# Patient Record
Sex: MALE | Race: WHITE | Employment: OTHER | URBAN - METROPOLITAN AREA
[De-identification: names, ages, dates, MRNs, and addresses within clinical notes are randomized per-mention and may not be internally consistent; named-entity substitution may affect disease eponyms.]

---

## 2017-03-06 PROBLEM — I10 ESSENTIAL HYPERTENSION: Status: ACTIVE | Noted: 2017-03-06

## 2017-03-06 PROBLEM — E11.40 TYPE 2 DIABETES MELLITUS WITH DIABETIC NEUROPATHY, WITH LONG-TERM CURRENT USE OF INSULIN (HCC): Status: ACTIVE | Noted: 2017-03-06

## 2017-03-06 PROBLEM — E11.319 TYPE 2 DIABETES MELLITUS WITH RETINOPATHY, WITH LONG-TERM CURRENT USE OF INSULIN, MACULAR EDEMA PRESENCE UNSPECIFIED, UNSPECIFIED LATERALITY, UNSPECIFIED RETINOPATHY SEVERITY (HCC): Status: ACTIVE | Noted: 2017-03-06

## 2017-03-06 PROBLEM — Z79.4 TYPE 2 DIABETES MELLITUS WITH DIABETIC NEUROPATHY, WITH LONG-TERM CURRENT USE OF INSULIN (HCC): Status: ACTIVE | Noted: 2017-03-06

## 2017-03-06 PROBLEM — Z79.4 TYPE 2 DIABETES MELLITUS WITH RETINOPATHY, WITH LONG-TERM CURRENT USE OF INSULIN, MACULAR EDEMA PRESENCE UNSPECIFIED, UNSPECIFIED LATERALITY, UNSPECIFIED RETINOPATHY SEVERITY (HCC): Status: ACTIVE | Noted: 2017-03-06

## 2017-07-05 PROCEDURE — 82607 VITAMIN B-12: CPT | Performed by: FAMILY MEDICINE

## 2017-07-05 PROCEDURE — 82746 ASSAY OF FOLIC ACID SERUM: CPT | Performed by: FAMILY MEDICINE

## 2017-07-05 PROCEDURE — 87086 URINE CULTURE/COLONY COUNT: CPT | Performed by: FAMILY MEDICINE

## 2017-07-05 PROCEDURE — 81001 URINALYSIS AUTO W/SCOPE: CPT | Performed by: FAMILY MEDICINE

## 2017-10-10 PROCEDURE — 81015 MICROSCOPIC EXAM OF URINE: CPT | Performed by: UROLOGY

## 2017-10-10 PROCEDURE — 87086 URINE CULTURE/COLONY COUNT: CPT | Performed by: UROLOGY

## 2017-10-10 PROCEDURE — 87186 SC STD MICRODIL/AGAR DIL: CPT | Performed by: UROLOGY

## 2017-10-10 PROCEDURE — 87077 CULTURE AEROBIC IDENTIFY: CPT | Performed by: UROLOGY

## 2017-11-03 PROCEDURE — 82607 VITAMIN B-12: CPT | Performed by: FAMILY MEDICINE

## 2017-11-03 PROCEDURE — 86780 TREPONEMA PALLIDUM: CPT | Performed by: FAMILY MEDICINE

## 2017-11-03 PROCEDURE — 82746 ASSAY OF FOLIC ACID SERUM: CPT | Performed by: FAMILY MEDICINE

## 2017-11-03 PROCEDURE — 81003 URINALYSIS AUTO W/O SCOPE: CPT | Performed by: FAMILY MEDICINE

## 2018-04-26 PROCEDURE — 80061 LIPID PANEL: CPT | Performed by: INTERNAL MEDICINE

## 2018-04-26 PROCEDURE — 83721 ASSAY OF BLOOD LIPOPROTEIN: CPT | Performed by: INTERNAL MEDICINE

## 2019-07-29 PROCEDURE — 81003 URINALYSIS AUTO W/O SCOPE: CPT | Performed by: FAMILY MEDICINE

## 2019-07-29 PROCEDURE — 87086 URINE CULTURE/COLONY COUNT: CPT | Performed by: FAMILY MEDICINE

## 2021-08-30 PROBLEM — F33.1 MODERATE EPISODE OF RECURRENT MAJOR DEPRESSIVE DISORDER (HCC): Status: ACTIVE | Noted: 2021-08-30

## 2021-09-28 PROBLEM — B35.1 ONYCHOMYCOSIS: Status: ACTIVE | Noted: 2021-09-28

## 2021-10-11 ENCOUNTER — APPOINTMENT (OUTPATIENT)
Dept: WOUND CARE | Facility: HOSPITAL | Age: 71
End: 2021-10-11
Attending: CLINICAL NURSE SPECIALIST
Payer: MEDICARE

## 2021-10-12 ENCOUNTER — OFFICE VISIT (OUTPATIENT)
Dept: WOUND CARE | Facility: HOSPITAL | Age: 71
End: 2021-10-12
Attending: CLINICAL NURSE SPECIALIST
Payer: MEDICARE

## 2021-10-12 VITALS
TEMPERATURE: 99 F | HEIGHT: 69 IN | WEIGHT: 300 LBS | DIASTOLIC BLOOD PRESSURE: 87 MMHG | SYSTOLIC BLOOD PRESSURE: 151 MMHG | RESPIRATION RATE: 20 BRPM | BODY MASS INDEX: 44.43 KG/M2 | HEART RATE: 90 BPM | OXYGEN SATURATION: 92 %

## 2021-10-12 DIAGNOSIS — E11.40 TYPE 2 DIABETES MELLITUS WITH DIABETIC NEUROPATHY, WITH LONG-TERM CURRENT USE OF INSULIN (HCC): ICD-10-CM

## 2021-10-12 DIAGNOSIS — Z79.4 TYPE 2 DIABETES MELLITUS WITH DIABETIC NEUROPATHY, WITH LONG-TERM CURRENT USE OF INSULIN (HCC): ICD-10-CM

## 2021-10-12 DIAGNOSIS — B35.1 ONYCHOMYCOSIS: ICD-10-CM

## 2021-10-12 PROCEDURE — 99214 OFFICE O/P EST MOD 30 MIN: CPT | Performed by: CLINICAL NURSE SPECIALIST

## 2021-10-12 NOTE — PROGRESS NOTES
Subjective   Omega Part is a 66year old male. Patient presents with:  Wound Care    Patient is a 53-year-old male with a history of type 2 diabetes mellitus, Hyperlipidemia, hypertension, neuropathy, depression, S/P left BKA.   Patient was referred t prevention of reopening of foot/toe wounds. Patient and spouse verbalized understanding of instructions.        Encounter Diagnosis  Type 2 diabetes mellitus with diabetic neuropathy, with long-term current use of insulin ([facility] [address]  Onychomycosis    Problem Lis

## 2024-07-10 ENCOUNTER — HOSPITAL ENCOUNTER (INPATIENT)
Facility: HOSPITAL | Age: 74
LOS: 7 days | Discharge: HOME HEALTH | End: 2024-07-17
Attending: EMERGENCY MEDICINE | Admitting: INTERNAL MEDICINE
Payer: MEDICARE

## 2024-07-10 DIAGNOSIS — L03.115 CELLULITIS OF RIGHT LOWER EXTREMITY: Primary | ICD-10-CM

## 2024-07-10 LAB
ANION GAP SERPL CALC-SCNC: 8 MMOL/L (ref 0–18)
BASOPHILS # BLD AUTO: 0.08 X10(3) UL (ref 0–0.2)
BASOPHILS NFR BLD AUTO: 0.6 %
BUN BLD-MCNC: 28 MG/DL (ref 9–23)
BUN/CREAT SERPL: 18.1 (ref 10–20)
CALCIUM BLD-MCNC: 9.5 MG/DL (ref 8.7–10.4)
CHLORIDE SERPL-SCNC: 105 MMOL/L (ref 98–112)
CO2 SERPL-SCNC: 26 MMOL/L (ref 21–32)
CREAT BLD-MCNC: 1.55 MG/DL
DEPRECATED RDW RBC AUTO: 45.3 FL (ref 35.1–46.3)
EGFRCR SERPLBLD CKD-EPI 2021: 46 ML/MIN/1.73M2 (ref 60–?)
EOSINOPHIL # BLD AUTO: 0.53 X10(3) UL (ref 0–0.7)
EOSINOPHIL NFR BLD AUTO: 3.9 %
ERYTHROCYTE [DISTWIDTH] IN BLOOD BY AUTOMATED COUNT: 14.6 % (ref 11–15)
GLUCOSE BLD-MCNC: 155 MG/DL (ref 70–99)
GLUCOSE BLDC GLUCOMTR-MCNC: 188 MG/DL (ref 70–99)
HCT VFR BLD AUTO: 48.1 %
HGB BLD-MCNC: 16.3 G/DL
IMM GRANULOCYTES # BLD AUTO: 0.05 X10(3) UL (ref 0–1)
IMM GRANULOCYTES NFR BLD: 0.4 %
LACTATE SERPL-SCNC: 1.2 MMOL/L (ref 0.5–2)
LYMPHOCYTES # BLD AUTO: 2.13 X10(3) UL (ref 1–4)
LYMPHOCYTES NFR BLD AUTO: 15.8 %
MCH RBC QN AUTO: 29.1 PG (ref 26–34)
MCHC RBC AUTO-ENTMCNC: 33.9 G/DL (ref 31–37)
MCV RBC AUTO: 85.7 FL
MONOCYTES # BLD AUTO: 1.41 X10(3) UL (ref 0.1–1)
MONOCYTES NFR BLD AUTO: 10.5 %
NEUTROPHILS # BLD AUTO: 9.25 X10 (3) UL (ref 1.5–7.7)
NEUTROPHILS # BLD AUTO: 9.25 X10(3) UL (ref 1.5–7.7)
NEUTROPHILS NFR BLD AUTO: 68.8 %
OSMOLALITY SERPL CALC.SUM OF ELEC: 297 MOSM/KG (ref 275–295)
PLATELET # BLD AUTO: 651 10(3)UL (ref 150–450)
POTASSIUM SERPL-SCNC: 4.5 MMOL/L (ref 3.5–5.1)
RBC # BLD AUTO: 5.61 X10(6)UL
SODIUM SERPL-SCNC: 139 MMOL/L (ref 136–145)
WBC # BLD AUTO: 13.5 X10(3) UL (ref 4–11)

## 2024-07-10 PROCEDURE — 87186 SC STD MICRODIL/AGAR DIL: CPT | Performed by: EMERGENCY MEDICINE

## 2024-07-10 PROCEDURE — 83605 ASSAY OF LACTIC ACID: CPT | Performed by: EMERGENCY MEDICINE

## 2024-07-10 PROCEDURE — 87205 SMEAR GRAM STAIN: CPT | Performed by: EMERGENCY MEDICINE

## 2024-07-10 PROCEDURE — 87040 BLOOD CULTURE FOR BACTERIA: CPT | Performed by: EMERGENCY MEDICINE

## 2024-07-10 PROCEDURE — 80048 BASIC METABOLIC PNL TOTAL CA: CPT | Performed by: EMERGENCY MEDICINE

## 2024-07-10 PROCEDURE — 87070 CULTURE OTHR SPECIMN AEROBIC: CPT | Performed by: EMERGENCY MEDICINE

## 2024-07-10 PROCEDURE — 82962 GLUCOSE BLOOD TEST: CPT

## 2024-07-10 PROCEDURE — 36415 COLL VENOUS BLD VENIPUNCTURE: CPT

## 2024-07-10 PROCEDURE — 99285 EMERGENCY DEPT VISIT HI MDM: CPT

## 2024-07-10 PROCEDURE — 87077 CULTURE AEROBIC IDENTIFY: CPT | Performed by: EMERGENCY MEDICINE

## 2024-07-10 PROCEDURE — 85025 COMPLETE CBC W/AUTO DIFF WBC: CPT | Performed by: EMERGENCY MEDICINE

## 2024-07-10 PROCEDURE — 96365 THER/PROPH/DIAG IV INF INIT: CPT

## 2024-07-10 RX ORDER — DEXTROSE MONOHYDRATE 25 G/50ML
50 INJECTION, SOLUTION INTRAVENOUS
Status: DISCONTINUED | OUTPATIENT
Start: 2024-07-10 | End: 2024-07-17

## 2024-07-10 RX ORDER — NICOTINE POLACRILEX 4 MG
30 LOZENGE BUCCAL
Status: DISCONTINUED | OUTPATIENT
Start: 2024-07-10 | End: 2024-07-17

## 2024-07-10 RX ORDER — NICOTINE POLACRILEX 4 MG
15 LOZENGE BUCCAL
Status: DISCONTINUED | OUTPATIENT
Start: 2024-07-10 | End: 2024-07-17

## 2024-07-10 RX ORDER — BUPROPION HYDROCHLORIDE 150 MG/1
150 TABLET ORAL DAILY
Status: DISCONTINUED | OUTPATIENT
Start: 2024-07-11 | End: 2024-07-17

## 2024-07-10 RX ORDER — CLOPIDOGREL BISULFATE 75 MG/1
75 TABLET ORAL DAILY
Status: DISCONTINUED | OUTPATIENT
Start: 2024-07-11 | End: 2024-07-17

## 2024-07-10 RX ORDER — ACETAMINOPHEN 325 MG/1
650 TABLET ORAL EVERY 6 HOURS PRN
Status: DISCONTINUED | OUTPATIENT
Start: 2024-07-10 | End: 2024-07-17

## 2024-07-10 RX ORDER — INSULIN DEGLUDEC 100 U/ML
40 INJECTION, SOLUTION SUBCUTANEOUS NIGHTLY
Status: DISCONTINUED | OUTPATIENT
Start: 2024-07-10 | End: 2024-07-17

## 2024-07-10 RX ORDER — DOXYCYCLINE HYCLATE 100 MG/1
100 CAPSULE ORAL 2 TIMES DAILY
COMMUNITY
End: 2024-07-17

## 2024-07-10 RX ORDER — PANTOPRAZOLE SODIUM 40 MG/1
40 TABLET, DELAYED RELEASE ORAL
Status: DISCONTINUED | OUTPATIENT
Start: 2024-07-11 | End: 2024-07-17

## 2024-07-10 RX ORDER — CLOPIDOGREL BISULFATE 75 MG/1
75 TABLET ORAL DAILY
COMMUNITY

## 2024-07-10 RX ORDER — ENOXAPARIN SODIUM 100 MG/ML
40 INJECTION SUBCUTANEOUS DAILY
Status: DISCONTINUED | OUTPATIENT
Start: 2024-07-11 | End: 2024-07-11

## 2024-07-10 RX ORDER — GEMFIBROZIL 600 MG/1
600 TABLET, FILM COATED ORAL DAILY
COMMUNITY

## 2024-07-10 RX ORDER — ROSUVASTATIN CALCIUM 20 MG/1
20 TABLET, COATED ORAL NIGHTLY
COMMUNITY

## 2024-07-10 RX ORDER — ROSUVASTATIN CALCIUM 20 MG/1
20 TABLET, COATED ORAL NIGHTLY
Status: DISCONTINUED | OUTPATIENT
Start: 2024-07-10 | End: 2024-07-17

## 2024-07-10 RX ORDER — VANCOMYCIN 2 GRAM/500 ML IN 0.9 % SODIUM CHLORIDE INTRAVENOUS
15 EVERY 12 HOURS
Status: DISCONTINUED | OUTPATIENT
Start: 2024-07-10 | End: 2024-07-11

## 2024-07-10 RX ORDER — AMLODIPINE BESYLATE 5 MG/1
5 TABLET ORAL DAILY
Status: DISCONTINUED | OUTPATIENT
Start: 2024-07-11 | End: 2024-07-17

## 2024-07-10 RX ORDER — GEMFIBROZIL 600 MG/1
600 TABLET, FILM COATED ORAL DAILY
Status: DISCONTINUED | OUTPATIENT
Start: 2024-07-11 | End: 2024-07-17

## 2024-07-10 RX ORDER — GABAPENTIN 100 MG/1
100 CAPSULE ORAL 3 TIMES DAILY
Status: DISCONTINUED | OUTPATIENT
Start: 2024-07-10 | End: 2024-07-17

## 2024-07-10 NOTE — ED INITIAL ASSESSMENT (HPI)
Pt to ED for cellulitis to the right leg. Pt put on antibiotics, but PCP wants pt to have a followup in the ED. Pt denies any increased redness, denies any fevers.

## 2024-07-11 LAB
ANION GAP SERPL CALC-SCNC: 7 MMOL/L (ref 0–18)
BUN BLD-MCNC: 27 MG/DL (ref 9–23)
BUN/CREAT SERPL: 17.5 (ref 10–20)
CALCIUM BLD-MCNC: 9.1 MG/DL (ref 8.7–10.4)
CHLORIDE SERPL-SCNC: 106 MMOL/L (ref 98–112)
CO2 SERPL-SCNC: 27 MMOL/L (ref 21–32)
CREAT BLD-MCNC: 1.54 MG/DL
DEPRECATED RDW RBC AUTO: 46.4 FL (ref 35.1–46.3)
EGFRCR SERPLBLD CKD-EPI 2021: 47 ML/MIN/1.73M2 (ref 60–?)
ERYTHROCYTE [DISTWIDTH] IN BLOOD BY AUTOMATED COUNT: 14.6 % (ref 11–15)
EST. AVERAGE GLUCOSE BLD GHB EST-MCNC: 192 MG/DL (ref 68–126)
GLUCOSE BLD-MCNC: 199 MG/DL (ref 70–99)
GLUCOSE BLDC GLUCOMTR-MCNC: 118 MG/DL (ref 70–99)
GLUCOSE BLDC GLUCOMTR-MCNC: 142 MG/DL (ref 70–99)
GLUCOSE BLDC GLUCOMTR-MCNC: 196 MG/DL (ref 70–99)
GLUCOSE BLDC GLUCOMTR-MCNC: 214 MG/DL (ref 70–99)
HBA1C MFR BLD: 8.3 % (ref ?–5.7)
HCT VFR BLD AUTO: 46 %
HGB BLD-MCNC: 15.4 G/DL
MCH RBC QN AUTO: 28.8 PG (ref 26–34)
MCHC RBC AUTO-ENTMCNC: 33.5 G/DL (ref 31–37)
MCV RBC AUTO: 86 FL
OSMOLALITY SERPL CALC.SUM OF ELEC: 301 MOSM/KG (ref 275–295)
PLATELET # BLD AUTO: 605 10(3)UL (ref 150–450)
POTASSIUM SERPL-SCNC: 4.5 MMOL/L (ref 3.5–5.1)
RBC # BLD AUTO: 5.35 X10(6)UL
SODIUM SERPL-SCNC: 140 MMOL/L (ref 136–145)
WBC # BLD AUTO: 12.1 X10(3) UL (ref 4–11)

## 2024-07-11 PROCEDURE — 82962 GLUCOSE BLOOD TEST: CPT

## 2024-07-11 PROCEDURE — 99212 OFFICE O/P EST SF 10 MIN: CPT

## 2024-07-11 PROCEDURE — 83036 HEMOGLOBIN GLYCOSYLATED A1C: CPT | Performed by: INTERNAL MEDICINE

## 2024-07-11 PROCEDURE — 85027 COMPLETE CBC AUTOMATED: CPT | Performed by: INTERNAL MEDICINE

## 2024-07-11 PROCEDURE — 80048 BASIC METABOLIC PNL TOTAL CA: CPT | Performed by: INTERNAL MEDICINE

## 2024-07-11 RX ORDER — MELATONIN
3 NIGHTLY PRN
Status: DISCONTINUED | OUTPATIENT
Start: 2024-07-11 | End: 2024-07-17

## 2024-07-11 RX ORDER — ACETAMINOPHEN 500 MG
1000 TABLET ORAL EVERY 6 HOURS PRN
Status: DISCONTINUED | OUTPATIENT
Start: 2024-07-11 | End: 2024-07-17

## 2024-07-11 RX ORDER — HEPARIN SODIUM 5000 [USP'U]/ML
5000 INJECTION, SOLUTION INTRAVENOUS; SUBCUTANEOUS EVERY 8 HOURS SCHEDULED
Status: DISCONTINUED | OUTPATIENT
Start: 2024-07-11 | End: 2024-07-11

## 2024-07-11 RX ORDER — ROSUVASTATIN CALCIUM 20 MG/1
20 TABLET, COATED ORAL NIGHTLY
Status: DISCONTINUED | OUTPATIENT
Start: 2024-07-11 | End: 2024-07-11

## 2024-07-11 RX ORDER — BISACODYL 10 MG
10 SUPPOSITORY, RECTAL RECTAL
Status: DISCONTINUED | OUTPATIENT
Start: 2024-07-11 | End: 2024-07-17

## 2024-07-11 RX ORDER — SENNOSIDES 8.6 MG
17.2 TABLET ORAL NIGHTLY PRN
Status: DISCONTINUED | OUTPATIENT
Start: 2024-07-11 | End: 2024-07-17

## 2024-07-11 RX ORDER — POLYETHYLENE GLYCOL 3350 17 G/17G
17 POWDER, FOR SOLUTION ORAL DAILY PRN
Status: DISCONTINUED | OUTPATIENT
Start: 2024-07-11 | End: 2024-07-17

## 2024-07-11 RX ORDER — CLOPIDOGREL BISULFATE 75 MG/1
75 TABLET ORAL DAILY
Status: DISCONTINUED | OUTPATIENT
Start: 2024-07-11 | End: 2024-07-11

## 2024-07-11 RX ORDER — ONDANSETRON 2 MG/ML
4 INJECTION INTRAMUSCULAR; INTRAVENOUS EVERY 6 HOURS PRN
Status: DISCONTINUED | OUTPATIENT
Start: 2024-07-11 | End: 2024-07-17

## 2024-07-11 RX ORDER — HEPARIN SODIUM 5000 [USP'U]/ML
5000 INJECTION, SOLUTION INTRAVENOUS; SUBCUTANEOUS EVERY 8 HOURS SCHEDULED
Status: DISCONTINUED | OUTPATIENT
Start: 2024-07-12 | End: 2024-07-14

## 2024-07-11 NOTE — CONSULTS
[facility]  part of [facility] Infectious Disease Consult    Connor Mcgarry Patient Status:  Inpatient    1950 MRN [id number]   Location [facility] 5SW/SE Attending Noah Pollack MD   Hosp Day # 1 PCP Brittny Patino DO     Reason for Consultation:  To help with infection and treatment, requested by Dr. Pollack,     History of Present Illness:  Connor Mcgarry is a 74 year old male admitted to [facility] on 7/10 with RLE Erythema,   Significant hx of   - DM,  - Hx of L BKA 10 years ago, was colonized with MRSA and PSAR at that time too,   - Morbid obesity,   - Hx of Cellulitis of LE in the past,   Came to the hospital for new Cellulitis of RLE, erythema was going on for a week PTA,  He was on PO Doxy as OP for four days with out much improvement,   Had some blisters which desquamated,   Also had significant drainage from the LE,  No fever, chills,  Secondary to all of the above he decided to come to the hospital,   ID is now asked to help with infection and treatment,        History:  Past Medical History:    Anemia    BPH (benign prostatic hyperplasia)    Calculus of kidney    Cataract    with lens implants in both eyes    Hearing impairment    Has hearing aid in right ear.    Hiatal hernia    High blood pressure    High cholesterol    HTN (hypertension)    Hypertrophy of prostate without urinary obstruction and other lower urinary tract symptoms (LUTS)    Neuropathy    bill feet    Other and unspecified hyperlipidemia    Peripheral vascular disease (HCC)    Type II or unspecified type diabetes mellitus without mention of complication, not stated as uncontrolled    Unspecified essential hypertension     Past Surgical History:   Procedure Laterality Date    Angiogram      Angioplasty (coronary)      Cataract      Other      heplock ? for IV antibiotic    Other surgical history      Cataract surgery Left eye    Other surgical history  14    Cysto Stent Removal-Dr Claros    Other  surgical history  5/2014    Left great toe amputation    Other surgical history  1966    spleenectomy     Other surgical history  8/22/14    Left knee reamputation BKA    Remv cataract extracap,insert lens  11/29/2012    Procedure: RIGHT PHACOEMULSIFICATION OF CATARACT WITH INTRAOCULAR LENS IMPLANT [id number];  Surgeon: Delia Rizvi MD;  Location: [facility]    Subconjunctival injectn  12/11/2012    Procedure: PARS PLANA VITRECTOMY 25 GAUGE;  Surgeon: Geovany Odom MD;  Location: [facility]    Vitrectomy,panretinal laser rx  12/11/2012    Procedure: PARS PLANA VITRECTOMY 25 GAUGE;  Surgeon: Geovany Odom MD;  Location: [facility]     Family History   Problem Relation Age of Onset    Diabetes Father     Heart Disorder Father     Hypertension Father     Hypertension Mother     Other (hemolytic anemia) Mother     Hypertension Daughter     Other (hemolytic anemia) Daughter     Other (wagners disease) Daughter       reports that he has never smoked. He has never used smokeless tobacco. He reports that he does not drink alcohol and does not use drugs.    Allergies:  No Known Allergies    Medications:    Current Facility-Administered Medications:     acetaminophen (Tylenol Extra Strength) tab 1,000 mg, 1,000 mg, Oral, Q6H PRN    melatonin tab 3 mg, 3 mg, Oral, Nightly PRN    polyethylene glycol (PEG 3350) (Miralax) 17 g oral packet 17 g, 17 g, Oral, Daily PRN    sennosides (Senokot) tab 17.2 mg, 17.2 mg, Oral, Nightly PRN    bisacodyl (Dulcolax) 10 MG rectal suppository 10 mg, 10 mg, Rectal, Daily PRN    ondansetron (Zofran) 4 MG/2ML injection 4 mg, 4 mg, Intravenous, Q6H PRN    [START ON 7/12/2024] heparin (Porcine) 5000 UNIT/ML injection 5,000 Units, 5,000 Units, Subcutaneous, Q8H JAYDEN    vancomycin (Vancocin) 2 g in sodium chloride 0.9% 500mL IVPB premix, 15 mg/kg, Intravenous, Q12H    piperacillin-tazobactam (Zosyn) 3.375 g in dextrose 5% 100 mL IVPB-ADDV, 3.375 g,  Intravenous, Q8H    acetaminophen (Tylenol) tab 650 mg, 650 mg, Oral, Q6H PRN    amLODIPine (Norvasc) tab 5 mg, 5 mg, Oral, Daily    buPROPion ER (Wellbutrin XL) 24 hr tab 150 mg, 150 mg, Oral, Daily    gabapentin (Neurontin) cap 100 mg, 100 mg, Oral, TID    pantoprazole (Protonix) DR tab 40 mg, 40 mg, Oral, QAM AC    insulin degludec (Tresiba) 100 units/mL flextouch 40 Units, 40 Units, Subcutaneous, Nightly    clopidogrel (Plavix) tab 75 mg, 75 mg, Oral, Daily    gemfibrozil (Lopid) tab 600 mg, 600 mg, Oral, Daily    rosuvastatin (Crestor) tab 20 mg, 20 mg, Oral, Nightly    glucose (Dex4) 15 GM/59ML oral liquid 15 g, 15 g, Oral, Q15 Min PRN **OR** glucose (Glutose) 40% oral gel 15 g, 15 g, Oral, Q15 Min PRN **OR** glucose-vitamin C (Dex-4) chewable tab 4 tablet, 4 tablet, Oral, Q15 Min PRN **OR** dextrose 50% injection 50 mL, 50 mL, Intravenous, Q15 Min PRN **OR** glucose (Dex4) 15 GM/59ML oral liquid 30 g, 30 g, Oral, Q15 Min PRN **OR** glucose (Glutose) 40% oral gel 30 g, 30 g, Oral, Q15 Min PRN **OR** glucose-vitamin C (Dex-4) chewable tab 8 tablet, 8 tablet, Oral, Q15 Min PRN    insulin aspart (NovoLOG) 100 Units/mL FlexPen 1-5 Units, 1-5 Units, Subcutaneous, TID CC    vancomycin (Vancocin) 1,500 mg in sodium chloride 0.9% 500 mL IVPB, 1,500 mg, Intravenous, Q24H    insulin aspart (NovoLOG) 100 Units/mL FlexPen 20 Units, 20 Units, Subcutaneous, TID CC    Review of Systems:   Constitutional: Negative for anorexia, chills, fatigue, fevers, malaise, night sweats and weight loss.  Eyes: Negative for visual disturbance, irritation and redness.  Ears, nose, mouth, throat, and face: Negative for hearing loss, tinnitus, nasal congestion, snoring, sore throat, hoarseness and voice change.  Respiratory: Negative for cough, sputum, hemoptysis, chest pain, wheezing, dyspnea on exertion, or stridor.  Cardiovascular: Negative for chest pain, palpitations, irregular heart beats, syncope, fatigue, orthopnea, paroxysmal  nocturnal dyspnea, lower extremity edema.  Gastrointestinal: Negative for dysphagia, odynophagia, reflux symptoms, nausea, vomiting, change in bowel habits, diarrhea, constipation and abdominal pain.  Integument/breast: Negative for rash, skin lesions, and pruritus.  Hematologic/lymphatic: Negative for easy bruising, bleeding, and lymphadenopathy.  Musculoskeletal: Negative for myalgias, arthralgias, muscle weakness.  Neurological: Negative for headaches, dizziness, seizures, memory problems, trouble swallowing, speech problems, gait problems and weakness.  Behavioral/Psych: Negative for active tobacco use.  Endocrine: No history of of diabetes, thyroid disorder.  All other review of systems are negative.    Vital signs in last 24 hours:  Patient Vitals for the past 24 hrs:   BP Temp Temp src Pulse Resp SpO2 Height Weight   07/11/24 1400 110/65 99 °F (37.2 °C) Oral -- 18 92 % -- --   07/11/24 0800 119/60 98.3 °F (36.8 °C) Oral -- 18 92 % -- --   07/11/24 0610 131/66 97.8 °F (36.6 °C) Oral 84 18 92 % -- --   07/11/24 0000 -- -- -- 91 -- -- -- --   07/10/24 2121 127/57 98.5 °F (36.9 °C) Oral 90 18 93 % 5' 6\" (1.676 m) 287 lb 8 oz (130.4 kg)   07/10/24 2037 132/62 -- -- 96 22 92 % -- --   07/10/24 1954 -- -- -- -- -- -- -- 293 lb 3.4 oz (133 kg)   07/10/24 1732 143/69 -- -- -- -- 92 % -- --   07/10/24 1731 -- 98.1 °F (36.7 °C) Temporal 88 18 -- -- --       Physical Exam:   General: alert, cooperative, oriented.  No respiratory distress.   Head: Normocephalic, without obvious abnormality, atraumatic.   Eyes: Conjunctivae/corneas clear.    Nose: Nares normal.   Throat: Lips, mucosa, and tongue normal.  No thrush noted.   Neck: Soft, supple neck; trachea midline,    Lungs: CTAB, normal and equal bilateral chest rise   Chest wall: No tenderness or deformity.   Heart: Regular rate and rhythm, normal S1S2, no murmur.   Abdomen: soft, non-tender, non-distended, positive BS.   Extremity: LLE BKA site is clean, RLE superficial  desquamated blisters clean sites, erythema,    Skin: erythema RLE, dry skin all over,    Neurological: Alert, interactive, no focal deficits    Labs:  Lab Results   Component Value Date    WBC 12.1 07/11/2024    HGB 15.4 07/11/2024    HCT 46.0 07/11/2024    .0 07/11/2024    CREATSERUM 1.54 07/11/2024    BUN 27 07/11/2024     07/11/2024    K 4.5 07/11/2024     07/11/2024    CO2 27.0 07/11/2024     07/11/2024    CA 9.1 07/11/2024       Radiology:  Reviewed,       Cultures:  Reviewed,     Assessment and Plan:    RLE Cellulitis with desquamated blisters: copious drainage from the leg,   - No fever, chills,   - Drainage cul with + GS, no cul growth so far. ? Use of abx PTA  - Blood Cul are NGTD   - Wound care to see,   - Historically grew MRSA and PSAR in the past,   - Agree with IV Vanc and Zosyn, will de escalate soon,   - Leg elevation,   - Hx of LLE BKA 10 years ago, uses prosthesis,     2.   Leukocytosis: sec to above, will trend,     3.   DM: Needs tighter control,     4.    Disposition: in house, an elderly male with hx of Cellulitis admitted with RLE Cellulitis with blistering and drainage,   - Follow Cul,   - Continue IV Vanc and IV Zosyn, pharm to dose,   - Compression wraps per Wound care,   - Leg elevation,   - Tighter control of DM,    Discussed with patient, RN, all questions answered, further recommendations to follow,   Thank you for consulting [facility] for Connor Mcgarry.  If you have any questions or concerns please call [facility] and [facility] Infectious Disease at [phone].     Gadiel Katz MD  7/11/2024  3:21 PM

## 2024-07-11 NOTE — PLAN OF CARE
Problem: Patient Centered Care  Goal: Patient preferences are identified and integrated in the patient's plan of care  Description: Interventions:  - What would you like us to know as we care for you?   - Provide timely, complete, and accurate information to patient/family  - Incorporate patient and family knowledge, values, beliefs, and cultural backgrounds into the planning and delivery of care  - Encourage patient/family to participate in care and decision-making at the level they choose  - Honor patient and family perspectives and choices  Outcome: Progressing     Problem: Diabetes/Glucose Control  Goal: Glucose maintained within prescribed range  Description: INTERVENTIONS:  - Monitor Blood Glucose as ordered  - Assess for signs and symptoms of hyperglycemia and hypoglycemia  - Administer ordered medications to maintain glucose within target range  - Assess barriers to adequate nutritional intake and initiate nutrition consult as needed  - Instruct patient on self management of diabetes  Outcome: Progressing   Scheduled medications given- see MAR, bed at lowest position, belongings and call light within reach. Frequent staff rounding.

## 2024-07-11 NOTE — PROGRESS NOTES
WOUND CARE NOTE    History:  Past Medical History:    Anemia    BPH (benign prostatic hyperplasia)    Calculus of kidney    Cataract    with lens implants in both eyes    Hearing impairment    Has hearing aid in right ear.    Hiatal hernia    High blood pressure    High cholesterol    HTN (hypertension)    Hypertrophy of prostate without urinary obstruction and other lower urinary tract symptoms (LUTS)    Neuropathy    bill feet    Other and unspecified hyperlipidemia    Peripheral vascular disease (HCC)    Type II or unspecified type diabetes mellitus without mention of complication, not stated as uncontrolled    Unspecified essential hypertension     Past Surgical History:   Procedure Laterality Date    Angiogram      Angioplasty (coronary)      Cataract      Other      heplock ? for IV antibiotic    Other surgical history  2012    Cataract surgery Left eye    Other surgical history  7/2/14    Cysto Stent Removal-Dr Claros    Other surgical history  5/2014    Left great toe amputation    Other surgical history  1966    spleenectomy     Other surgical history  8/22/14    Left knee reamputation BKA    Remv cataract extracap,insert lens  11/29/2012    Procedure: RIGHT PHACOEMULSIFICATION OF CATARACT WITH INTRAOCULAR LENS IMPLANT [id number];  Surgeon: Delia Rizvi MD;  Location: [facility]    Subconjunctival injectn  12/11/2012    Procedure: PARS PLANA VITRECTOMY 25 GAUGE;  Surgeon: Geovany Odom MD;  Location: [facility]    Vitrectomy,panretinal laser rx  12/11/2012    Procedure: PARS PLANA VITRECTOMY 25 GAUGE;  Surgeon: Geovany Odom MD;  Location: [facility]      Social History     Socioeconomic History    Marital status:    Tobacco Use    Smoking status: Never    Smokeless tobacco: Never   Substance and Sexual Activity    Alcohol use: No     Alcohol/week: 0.0 standard drinks of alcohol    Drug use: No     Social Determinants of Health     Food Insecurity: No Food  Insecurity (7/11/2024)    Food Insecurity     Food Insecurity: Never true   Transportation Needs: No Transportation Needs (7/11/2024)    Transportation Needs     Lack of Transportation: No   Housing Stability: Low Risk  (7/11/2024)    Housing Stability     Housing Instability: No       Lower Extremity Assessment:  Left BKA, prothesis on and intact  Right LE erythema pain, scattered ulcers, right foot is warm, audible pedal and post tibial pulses with the hand held doppler    PLAN   Recommendations:  ID is currently on consult  Elevate legs and right foot in the bed and in the chair  Turn schedules  Elevate RLE in the bed and in the chair  Rt heel elevated using pillows to offload right heel  Glucose control to help promote wound healing    Wound(s)  Location: RLE scattered open ulcers  Cleansing  Saline   Topical Vaseline to dry skin and dry scabs  Dressings Xeroform gauze, Aquacel Extra alginate, abd pads   Secure with Kerlix roll  Frequency daily and prn if saturated     Compression:  SpandaGrip: 1 layer size F from base of toes to bend in knees. TO RLE      Discharge Recommendations: The pt. is from [place] and will need assist with RLE wound care        OBJECTIVE   MD Consult new RLE      ASSESSMENT   Jesus Score:  Jesus Scale Score: 19    Chart Reviewed: yes    Wound(s):  The pt. is sitting up in the chair, elevated his right leg in the chair, left BKA. See the wound assessments. RLE wounds were cleansed and redressed. Used the hand held doppler and heard his pedal and post tibial pulses. Educated the pt. on the Spandagrip compression and elevation of the RLE. All questions answered. Call light in reach.          Allergies: Patient has no known allergies.    Labs:   Lab Results   Component Value Date    WBC 12.1 (H) 07/11/2024    HGB 15.4 07/11/2024    HCT 46.0 07/11/2024    .0 (H) 07/11/2024    CREATSERUM 1.54 (H) 07/11/2024    BUN 27 (H) 07/11/2024     07/11/2024    K 4.5 07/11/2024      07/11/2024    CO2 27.0 07/11/2024     (H) 07/11/2024    CA 9.1 07/11/2024    ALB 4.4 01/17/2022    ALKPHO 113 01/17/2022    BILT 0.42 01/17/2022    TP 7.9 01/17/2022    AST 23 01/17/2022    ALT 23 01/17/2022    MG 1.80 02/28/2019     No results found for: \"PREALBUMIN\"      Time Spent 30 Minutes.    Kristie Vasquez RN  [facility] IP Wound Care  [facility]  [phone]     07/11/24 1356   Wound 07/10/24 Leg Right;Lateral   Date First Assessed/Time First Assessed: 07/10/24 2315   Present on Original Admission: Yes  Primary Wound Type: Venous Ulcer  Location: Leg  Wound Location Orientation: Right;Lateral   Wound Image    Site Assessment Fragile;Moist;Pink;Red;Painful  (and a dry scab)   Drainage Amount Moderate   Drainage Description Serosanguineous   Treatments Cleansed;Saline;Site Care  (Vaseline applied to the dry scab)   Dressing Xeroform;Aquacel;ABD Pad;Kerlix roll;Tape   Dressing Changed Changed   Dressing Status Clean;Dry;Intact;Dressing Changed   Wound Depth (cm) 0.1 cm   Non-staged Wound Description Partial thickness   Luz Maria-wound Assessment Edema;Fragile;Dry;Pink;Red   Wound Granulation Tissue Red;Pink   Wound Bed Granulation (%) 100 %   State of Healing Early/partial granulation   Wound Odor None   Wound 07/10/24 Leg Right;Distal;Anterior   Date First Assessed/Time First Assessed: 07/10/24 2315   Present on Original Admission: Yes  Primary Wound Type: Venous Ulcer  Location: Leg  Wound Location Orientation: Right;Distal;Anterior   Wound Image    Site Assessment Fragile;Moist;Red;Pink;Painful   Drainage Amount Small   Drainage Description Serosanguineous   Treatments Cleansed;Saline;Site Care  (Vaseline to lateral leg scab)   Dressing Xeroform;Aquacel;ABD Pad;Kerlix roll;Tape   Dressing Changed Changed   Dressing Status Clean;Dry;Intact;Dressing Changed   Wound Depth (cm) 0.1 cm   Non-staged Wound Description Partial thickness   Luz Maria-wound Assessment Dry;Edema;Fragile;Pink;Red   Wound  Granulation Tissue Red;Pink   Wound Bed Granulation (%) 100 %   State of Healing Early/partial granulation   Wound Odor None   Wound 07/10/24 Leg Left;Lower;Posterior   Date First Assessed/Time First Assessed: 07/10/24 2315   Present on Original Admission: Yes  Primary Wound Type: Venous Ulcer  Location: Leg  Wound Location Orientation: Left;Lower;Posterior   Wound Image    Site Assessment Moist;Fragile;Pink;Painful;Red;Yellow  (and dry scabs)   Drainage Amount Small   Drainage Description Serosanguineous   Treatments Cleansed;Saline;Site Care  (Vaseline to dry scab)   Dressing Xeroform;Aquacel;ABD Pad;Kerlix roll;Tape   Dressing Changed Changed   Dressing Status Clean;Dry;Intact;Dressing Changed   Wound Depth (cm) 0.1 cm   Non-staged Wound Description Partial thickness   Luz Maria-wound Assessment Dry;Intact;Edema;Fragile;Pink;Red;Painful   Wound Granulation Tissue Red;Pink   Wound Bed Granulation (%) 75 %   Wound Bed Fibrin (%) 25 %   State of Healing Early/partial granulation   Wound Odor None   Wound Follow Up   Follow up needed Yes        07/11/24 1356   Wound 07/10/24 Leg Right;Lateral   Date First Assessed/Time First Assessed: 07/10/24 2315   Present on Original Admission: Yes  Primary Wound Type: Venous Ulcer  Location: Leg  Wound Location Orientation: Right;Lateral   Wound Image    Site Assessment Fragile;Moist;Pink;Red;Painful  (and a dry scab)   Drainage Amount Moderate   Drainage Description Serosanguineous   Treatments Cleansed;Saline;Site Care  (Vaseline applied to the dry scab)   Dressing Xeroform;Aquacel;ABD Pad;Kerlix roll;Tape   Dressing Changed Changed   Dressing Status Clean;Dry;Intact;Dressing Changed   Wound Depth (cm) 0.1 cm   Non-staged Wound Description Partial thickness   Luz Maria-wound Assessment Edema;Fragile;Dry;Pink;Red   Wound Granulation Tissue Red;Pink   Wound Bed Granulation (%) 100 %   State of Healing Early/partial granulation   Wound Odor None   Wound 07/10/24 Leg Right;Distal;Anterior    Date First Assessed/Time First Assessed: 07/10/24 2315   Present on Original Admission: Yes  Primary Wound Type: Venous Ulcer  Location: Leg  Wound Location Orientation: Right;Distal;Anterior   Wound Image    Site Assessment Fragile;Moist;Red;Pink;Painful   Drainage Amount Small   Drainage Description Serosanguineous   Treatments Cleansed;Saline;Site Care  (Vaseline to lateral leg scab)   Dressing Xeroform;Aquacel;ABD Pad;Kerlix roll;Tape   Dressing Changed Changed   Dressing Status Clean;Dry;Intact;Dressing Changed   Wound Depth (cm) 0.1 cm   Non-staged Wound Description Partial thickness   Luz Maria-wound Assessment Dry;Edema;Fragile;Pink;Red   Wound Granulation Tissue Red;Pink   Wound Bed Granulation (%) 100 %   State of Healing Early/partial granulation   Wound Odor None   Wound 07/10/24 Leg Left;Lower;Posterior   Date First Assessed/Time First Assessed: 07/10/24 2315   Present on Original Admission: Yes  Primary Wound Type: Venous Ulcer  Location: Leg  Wound Location Orientation: Left;Lower;Posterior   Wound Image    Site Assessment Moist;Fragile;Pink;Painful;Red;Yellow  (and dry scabs)   Drainage Amount Small   Drainage Description Serosanguineous   Treatments Cleansed;Saline;Site Care  (Vaseline to dry scab)   Dressing Xeroform;Aquacel;ABD Pad;Kerlix roll;Tape   Dressing Changed Changed   Dressing Status Clean;Dry;Intact;Dressing Changed   Wound Depth (cm) 0.1 cm   Non-staged Wound Description Partial thickness   Luz Maria-wound Assessment Dry;Intact;Edema;Fragile;Pink;Red;Painful   Wound Granulation Tissue Red;Pink   Wound Bed Granulation (%) 75 %   Wound Bed Fibrin (%) 25 %   State of Healing Early/partial granulation   Wound Odor None   Wound Follow Up   Follow up needed Yes

## 2024-07-11 NOTE — H&P
[facility] Hospitalist H&P       CC:   Chief Complaint   Patient presents with    Cellulitis        PCP: Brittny Patino DO    History of Present Illness: Mr. Mcgarry is a 74 year old male with PMH sig for type 2 DM, hs of left BKA, carotid artery stenosis, MDD, mobid obesity, CKD stage 3, who presents with recurrent right lower ext cellulitis.  Patient has had a history of cellulitis of his right lower extremity, recently treated with antibiotics with resolution, but had recurrence of symptoms redness and pain about 1 week ago, started on doxycycline, with minimal improvement, significant drainage surrounding the wound, therefore was recommended to come to the ER.  He denies any fever or chills, no chest pain or shortness of breath, no headaches or vision changes, no lightheadedness or dizziness, no other complaints at this time.      PMH  Past Medical History:    Anemia    BPH (benign prostatic hyperplasia)    Calculus of kidney    Cataract    with lens implants in both eyes    Hearing impairment    Has hearing aid in right ear.    Hiatal hernia    High blood pressure    High cholesterol    HTN (hypertension)    Hypertrophy of prostate without urinary obstruction and other lower urinary tract symptoms (LUTS)    Neuropathy    bill feet    Other and unspecified hyperlipidemia    Peripheral vascular disease (HCC)    Type II or unspecified type diabetes mellitus without mention of complication, not stated as uncontrolled    Unspecified essential hypertension        PSH  Past Surgical History:   Procedure Laterality Date    Angiogram      Angioplasty (coronary)      Cataract      Other      heplock ? for IV antibiotic    Other surgical history  2012    Cataract surgery Left eye    Other surgical history  7/2/14    Cysto Stent Removal-Dr Claros    Other surgical history  5/2014    Left great toe amputation    Other surgical history  1966    spleenectomy     Other surgical history  8/22/14    Left knee  reamputation BKA    Remv cataract extracap,insert lens  11/29/2012    Procedure: RIGHT PHACOEMULSIFICATION OF CATARACT WITH INTRAOCULAR LENS IMPLANT 20722;  Surgeon: Delia Rizvi MD;  Location: [facility]    Subconjunctival injectn  12/11/2012    Procedure: PARS PLANA VITRECTOMY 25 GAUGE;  Surgeon: Geovany Odom MD;  Location: [facility]    Vitrectomy,panretinal laser rx  12/11/2012    Procedure: PARS PLANA VITRECTOMY 25 GAUGE;  Surgeon: Geovany Odom MD;  Location: [facility]        ALL:  No Known Allergies     Home Medications:  Outpatient Medications Marked as Taking for the 7/10/24 encounter (Hospital Encounter)   Medication Sig Dispense Refill    doxycycline 100 MG Oral Cap Take 1 capsule (100 mg total) by mouth 2 (two) times daily.      rosuvastatin 20 MG Oral Tab Take 1 tablet (20 mg total) by mouth nightly.      gemfibrozil 600 MG Oral Tab Take 1 tablet (600 mg total) by mouth daily.      clopidogrel 75 MG Oral Tab Take 1 tablet (75 mg total) by mouth daily.      gabapentin (NEURONTIN) 100 MG Oral Cap Take 1 capsule (100 mg total) by mouth 3 (three) times daily. 90 capsule 0    Insulin Aspart Pen (NOVOLOG FLEXPEN) 100 UNIT/ML Subcutaneous Solution Pen-injector INJECT 10 UNITS BEFORE EACH MEAL THREE TIMES DAILY PLUS CORRECTION AS DIRECTED. MAX DAILY DOSE IS 75 UNITS 75 mL 3    Omeprazole 40 MG Oral Capsule Delayed Release Take 1 capsule (40 mg total) by mouth daily. 90 capsule 3    TRESIBA FLEXTOUCH 100 UNIT/ML Subcutaneous Solution Pen-injector Inject 66 Units/day into the skin daily. 60 mL 3    amLODIPine besylate 5 MG Oral Tab Take 1 tablet (5 mg total) by mouth daily. 90 tablet 3    buPROPion 150 MG Oral Tablet 24 Hr Take 1 tablet (150 mg total) by mouth daily. 90 tablet 3         Soc Hx  Social History     Tobacco Use    Smoking status: Never    Smokeless tobacco: Never   Substance Use Topics    Alcohol use: No     Alcohol/week: 0.0 standard drinks of  alcohol        Fam Hx  Family History   Problem Relation Age of Onset    Diabetes Father     Heart Disorder Father     Hypertension Father     Hypertension Mother     Other (hemolytic anemia) Mother     Hypertension Daughter     Other (hemolytic anemia) Daughter     Other (wagners disease) Daughter        Review of Systems  Comprehensive ROS reviewed and negative except for what's stated above.     OBJECTIVE:  /66 (BP Location: Left arm)   Pulse 84   Temp 97.8 °F (36.6 °C) (Oral)   Resp 18   Ht 5' 6\" (1.676 m)   Wt 287 lb 8 oz (130.4 kg)   SpO2 92%   BMI 46.40 kg/m²   General:  Alert, no distress   Head:  Normocephalic, without obvious abnormality, atraumatic.   Eyes:  Sclera anicteric, No conjunctival pallor,     Nose: Nares normal. Septum midline. Mucosa normal. No drainage.   Throat: Lips, mucosa, and tongue normal. Teeth and gums normal.   Neck: Supple,   Lungs:   Clear to auscultation bilaterally. Normal effort   Chest wall:  No tenderness or deformity.   Heart:  Regular rate and rhythm, S1, S2 normal, no murmur, rub or gallop appreciated   Abdomen:   Soft, non-tender. Bowel sounds normal. No masses,  No organomegaly. Non distended   Extremities: Right lower ext erythema with multiple draining wounds, left lower ext with prosthetic    Skin: Skin color, texture, turgor normal. No rashes or lesions.    Neurologic: Normal strength, no focal deficit appreciated     Diagnostic Data:    CBC/Chem  Recent Labs   Lab 07/10/24  1954 07/11/24  0319   WBC 13.5* 12.1*   HGB 16.3 15.4   MCV 85.7 86.0   .0* 605.0*       Recent Labs   Lab 07/10/24  1954 07/11/24  0319    140   K 4.5 4.5    106   CO2 26.0 27.0   BUN 28* 27*   CREATSERUM 1.55* 1.54*   * 199*   CA 9.5 9.1       No results for input(s): \"ALT\", \"AST\", \"ALB\", \"AMYLASE\", \"LIPASE\", \"LDH\" in the last 168 hours.    Invalid input(s): \"ALPHOS\", \"TBIL\", \"DBIL\", \"TPROT\"    No results for input(s): \"TROP\" in the last 168 hours.        Radiology: No results found.      ASSESSMENT / PLAN:   Mr. Mcgarry is a 74 year old male with PMH sig for type 2 DM, hs of left BKA, carotid artery stenosis, MDD, mobid obesity, CKD stage 3, who presents with recurrent right lower ext cellulitis.     Right lower extremity cellulitis  Leucocytosis   - erythema noted, not improving with doxy, WBC 13 on admit  - IV Vancomycin and zosyn started  - BC and wound cultures pending  - ID and wound care consulted  - elevated as tolerted    Type 2 DM  - A1c 8.3  - on novalog 20 TID, and treciba 40 units  - SSI and accuchecks    CKD stage 3  - cre 1.5 on admit, at baseline  - monitor     HTN  - amlodipine 5 mg    Carotid artery disease  PAD  - see vascular Dr. Zepeda  - continue plavix    Hs of left BKA  - prosthetic in place    Depression   - continue home meds    Morbid obesity  - weight loss endevours as OP    FN:  - IVF:none  - Diet: diabetic diet    DVT Prophy:scd, heparin  Lines: PIV    Dispo: pending clinical course    Outpatient records or previous hospital records reviewed.     Further recommendations pending patient's clinical course.  [facility] hospitalist to continue to follow patient while in house    Patient and/or patient's family given opportunity to ask questions and note understanding and agreeing with therapeutic plan as outlined    Thank You,  Noah Pollack MD    Hospitalist with [facility]  Answering Service number: [phone]

## 2024-07-11 NOTE — PLAN OF CARE
Problem: Patient Centered Care  Goal: Patient preferences are identified and integrated in the patient's plan of care  Description: Interventions:  - What would you like us to know as we care for you? Home with daughter  - Provide timely, complete, and accurate information to patient/family  - Incorporate patient and family knowledge, values, beliefs, and cultural backgrounds into the planning and delivery of care  - Encourage patient/family to participate in care and decision-making at the level they choose  - Honor patient and family perspectives and choices  Outcome: Progressing     Problem: Diabetes/Glucose Control  Goal: Glucose maintained within prescribed range  Description: INTERVENTIONS:  - Monitor Blood Glucose as ordered  - Assess for signs and symptoms of hyperglycemia and hypoglycemia  - Administer ordered medications to maintain glucose within target range  - Assess barriers to adequate nutritional intake and initiate nutrition consult as needed  - Instruct patient on self management of diabetes  Outcome: Progressing

## 2024-07-11 NOTE — PROGRESS NOTES
[facility] Pharmacy Dosing Service      Initial Pharmacokinetic Consult for Vancomycin Dosing     Connor Mcgarry is a 74 year old male who is being initiated on vancomycin therapy for cellulitis.  Pharmacy has been asked to dose vancomycin by Dr Horne.  The initial treatment and monitoring approach will be steady state AUC strategy.        Weight and Temperature:    Wt Readings from Last 1 Encounters:   07/10/24 130.4 kg (287 lb 8 oz)        Temp Readings from Last 1 Encounters:   07/10/24 98.5 °F (36.9 °C) (Oral)      Labs:   Recent Labs   Lab 07/10/24  1954   CREATSERUM 1.55*      Estimated Creatinine Clearance: 37.2 mL/min (A) (based on SCr of 1.55 mg/dL (H)).     Recent Labs   Lab 07/10/24  1954   WBC 13.5*          The Pharmacokinetic Target is:     to 600 mg-h/L and trough <=15 mg/L    Renal Dosing Considerations:    KARINA/ARF     Assessment/Plan:   Initial/Loading dose: Has received 2000 mg IV (15 mg/kg, capped at 2250 mg) x 1 initial dose.      Maintenance dose: Pharmacy will dose vancomycin at 1500 mg IV every 24 hours    Monitorin) Plan for vancomycin peak and trough to be obtained at steady state    2) Pharmacy will order SCr as clinically indicated to assess renal function.    3) Pharmacy will monitor for toxicity and efficacy, adjust vancomycin dose and/or frequency, and order vancomycin levels as appropriate per the Pharmacy and Therapeutics Committee approved protocol until discontinuation of the medication.       We appreciate the opportunity to assist in the care of this patient.     Moises Echevarria, PharmD  7/10/2024  10:14 PM  [place]  Pharmacy Extension: [phone]

## 2024-07-11 NOTE — ED PROVIDER NOTES
Patient Seen in: [facility] Emergency Department      History     Chief Complaint   Patient presents with    Cellulitis     Stated Complaint: cellulitis    Subjective:   HPI    Patient presents the emergency department complaining of a recurrent episode of cellulitis in his right lower leg.  He states that he had healed some blisters and ulcers in his leg and had improved from an infection perspective and then about a week ago the redness returned.  He has been on doxycycline for the last 3 days and now has open draining ulcers on his legs.  He is concerned because he had a amputation in his left leg due to infection in his foot as well.  He is on Plavix and aspirin.  He saw a vascular surgeon who stated that he had palpable pulses and they did not think he had a critical arterial problem causing his recurrent cellulitis.  There is no fever or chills.    Objective:   Past Medical History:    Anemia    BPH (benign prostatic hyperplasia)    Calculus of kidney    Cataract    with lens implants in both eyes    Hearing impairment    Has hearing aid in right ear.    Hiatal hernia    High blood pressure    High cholesterol    HTN (hypertension)    Hypertrophy of prostate without urinary obstruction and other lower urinary tract symptoms (LUTS)    Neuropathy    bill feet    Other and unspecified hyperlipidemia    Peripheral vascular disease (HCC)    Type II or unspecified type diabetes mellitus without mention of complication, not stated as uncontrolled    Unspecified essential hypertension              Past Surgical History:   Procedure Laterality Date    Angiogram      Angioplasty (coronary)      Cataract      Other      heplock ? for IV antibiotic    Other surgical history  2012    Cataract surgery Left eye    Other surgical history  7/2/14    Cysto Stent Removal-Dr Claros    Other surgical history  5/2014    Left great toe amputation    Other surgical history  1966    spleenectomy     Other surgical history   8/22/14    Left knee reamputation BKA    Remv cataract extracap,insert lens  11/29/2012    Procedure: RIGHT PHACOEMULSIFICATION OF CATARACT WITH INTRAOCULAR LENS IMPLANT [id number];  Surgeon: Delia Rizvi MD;  Location: [facility]    Subconjunctival injectn  12/11/2012    Procedure: PARS PLANA VITRECTOMY 25 GAUGE;  Surgeon: Geovany Odom MD;  Location: [facility]    Vitrectomy,panretinal laser rx  12/11/2012    Procedure: PARS PLANA VITRECTOMY 25 GAUGE;  Surgeon: Geovany Odom MD;  Location: [facility]                Social History     Socioeconomic History    Marital status:    Tobacco Use    Smoking status: Never    Smokeless tobacco: Never   Substance and Sexual Activity    Alcohol use: No     Alcohol/week: 0.0 standard drinks of alcohol    Drug use: No     Social Determinants of Health     Food Insecurity: No Food Insecurity (7/11/2024)    Food Insecurity     Food Insecurity: Never true   Transportation Needs: No Transportation Needs (7/11/2024)    Transportation Needs     Lack of Transportation: No   Housing Stability: Low Risk  (7/11/2024)    Housing Stability     Housing Instability: No              Review of Systems    Positive for stated Chief Complaint: Cellulitis    Other systems are as noted in HPI.  Constitutional and vital signs reviewed.      All other systems reviewed and negative except as noted above.    Physical Exam     ED Triage Vitals   BP 07/10/24 1732 143/69   Pulse 07/10/24 1731 88   Resp 07/10/24 1731 18   Temp 07/10/24 1731 98.1 °F (36.7 °C)   Temp src 07/10/24 1731 Temporal   SpO2 07/10/24 1732 92 %   O2 Device 07/10/24 1732 None (Room air)       Current Vitals:   Vital Signs  BP: 150/72  Pulse: 82  Resp: 18  Temp: 97.8 °F (36.6 °C)  Temp src: Oral  MAP (mmHg): 94    Oxygen Therapy  SpO2: 95 %  O2 Device: None (Room air)            Physical Exam  Vitals and nursing note reviewed.   Constitutional:       General: He is not in acute  distress.     Appearance: He is well-developed. He is obese.   HENT:      Head: Normocephalic.      Nose: Nose normal.      Mouth/Throat:      Mouth: Mucous membranes are moist.   Eyes:      Conjunctiva/sclera: Conjunctivae normal.   Cardiovascular:      Rate and Rhythm: Normal rate and regular rhythm.      Heart sounds: No murmur heard.  Pulmonary:      Effort: Pulmonary effort is normal. No respiratory distress.      Breath sounds: Normal breath sounds.   Abdominal:      General: There is no distension.      Palpations: Abdomen is soft.      Tenderness: There is no abdominal tenderness.   Musculoskeletal:         General: No tenderness. Normal range of motion.      Cervical back: Normal range of motion and neck supple.   Skin:     Comments: There is a left-sided below the knee amputation.  The right leg has cellulitis and erythema of the leg below the kneeWith open areas of drainage.  There are strong pulses in the foot and ankle with normal sensation to light touch.   Neurological:      Mental Status: He is alert and oriented to person, place, and time.               ED Course     Labs Reviewed   BASIC METABOLIC PANEL (8) - Abnormal; Notable for the following components:       Result Value    Glucose 155 (*)     BUN 28 (*)     Creatinine 1.55 (*)     Calculated Osmolality 297 (*)     eGFR-Cr 46 (*)     All other components within normal limits   HEMOGLOBIN A1C - Abnormal; Notable for the following components:    HgbA1C 8.3 (*)     Estimated Average Glucose 192 (*)     All other components within normal limits   CBC, PLATELET; NO DIFFERENTIAL - Abnormal; Notable for the following components:    WBC 12.1 (*)     RDW-SD 46.4 (*)     .0 (*)     All other components within normal limits   BASIC METABOLIC PANEL (8) - Abnormal; Notable for the following components:    Glucose 199 (*)     BUN 27 (*)     Creatinine 1.54 (*)     Calculated Osmolality 301 (*)     eGFR-Cr 47 (*)     All other components within normal  limits   CBC, PLATELET; NO DIFFERENTIAL - Abnormal; Notable for the following components:    RDW-SD 48.2 (*)     .0 (*)     All other components within normal limits   BASIC METABOLIC PANEL (8) - Abnormal; Notable for the following components:    Glucose 221 (*)     BUN 29 (*)     Creatinine 1.74 (*)     Calculated Osmolality 299 (*)     eGFR-Cr 40 (*)     All other components within normal limits   POCT GLUCOSE - Abnormal; Notable for the following components:    POC Glucose  188 (*)     All other components within normal limits   POCT GLUCOSE - Abnormal; Notable for the following components:    POC Glucose  214 (*)     All other components within normal limits   POCT GLUCOSE - Abnormal; Notable for the following components:    POC Glucose  142 (*)     All other components within normal limits   POCT GLUCOSE - Abnormal; Notable for the following components:    POC Glucose  118 (*)     All other components within normal limits   POCT GLUCOSE - Abnormal; Notable for the following components:    POC Glucose  196 (*)     All other components within normal limits   POCT GLUCOSE - Abnormal; Notable for the following components:    POC Glucose  216 (*)     All other components within normal limits   POCT GLUCOSE - Abnormal; Notable for the following components:    POC Glucose  119 (*)     All other components within normal limits   AEROBIC BACTERIAL CULTURE - Abnormal; Notable for the following components:    Aerobic Culture Result 1+ growth Staphylococcus aureus (*)     All other components within normal limits   CBC W/ DIFFERENTIAL - Abnormal; Notable for the following components:    WBC 13.5 (*)     .0 (*)     Neutrophil Absolute Prelim 9.25 (*)     Neutrophil Absolute 9.25 (*)     Monocyte Absolute 1.41 (*)     All other components within normal limits   LACTIC ACID, PLASMA - Normal   MAGNESIUM - Normal   CBC WITH DIFFERENTIAL WITH PLATELET    Narrative:     The following orders were created for panel  order CBC With Differential With Platelet.  Procedure                               Abnormality         Status                     ---------                               -----------         ------                     CBC W/ DIFFERENTIAL[[id number]]          Abnormal            Final result                 Please view results for these tests on the individual orders.   BLOOD CULTURE    Narrative:     Aerobic Bottle Volume - 9 mL  Anaerobic Bottle Volume - 9 mL   BLOOD CULTURE                      MDM        Admission disposition: 7/10/2024  8:29 PM                Medical Decision Making  Differential diagnosis considered for peripheral artery disease, cellulitis, diabetic ulcers.    Problems Addressed:  Cellulitis of right lower extremity: acute illness or injury    Amount and/or Complexity of Data Reviewed  Labs: ordered. Decision-making details documented in ED Course.     Details: Labs unremarkable  Discussion of management or test interpretation with external provider(s): Discussed with the hospitalist and infectious disease notified of consultation as well.  Blood culture sent.  Vancomycin and Rocephin started in the emergency department.    Risk  Prescription drug management.  Decision regarding hospitalization.        Disposition and Plan     Clinical Impression:  1. Cellulitis of right lower extremity         Disposition:  Admit  7/10/2024  8:29 pm    Follow-up:  No follow-up provider specified.  We recommend that you schedule follow up care with a primary care provider within the next three months to obtain basic health screening including reassessment of your blood pressure.      Medications Prescribed:  Current Discharge Medication List                            Hospital Problems       Present on Admission  Date Reviewed: 3/17/2022            ICD-10-CM Noted POA    * (Principal) Cellulitis of right lower extremity L03.115 7/10/2024 Unknown

## 2024-07-11 NOTE — ED QUICK NOTES
Orders for admission, patient is aware of plan and ready to go upstairs. Any questions, please call ED RN Chandrika at extension [phone].     Patient Covid vaccination status: Unvaccinated     COVID Test Ordered in ED: None    COVID Suspicion at Admission: N/A    Running Infusions:  None    Mental Status/LOC at time of transport: A&Ox4, RLE weeping, blisters.   Self assist    Other pertinent information: From home  CIWA score: N/A   NIH score:  N/A

## 2024-07-12 LAB
ANION GAP SERPL CALC-SCNC: 6 MMOL/L (ref 0–18)
BUN BLD-MCNC: 29 MG/DL (ref 9–23)
BUN/CREAT SERPL: 16.7 (ref 10–20)
CALCIUM BLD-MCNC: 9.5 MG/DL (ref 8.7–10.4)
CHLORIDE SERPL-SCNC: 106 MMOL/L (ref 98–112)
CO2 SERPL-SCNC: 26 MMOL/L (ref 21–32)
CREAT BLD-MCNC: 1.74 MG/DL
DEPRECATED RDW RBC AUTO: 48.2 FL (ref 35.1–46.3)
EGFRCR SERPLBLD CKD-EPI 2021: 40 ML/MIN/1.73M2 (ref 60–?)
ERYTHROCYTE [DISTWIDTH] IN BLOOD BY AUTOMATED COUNT: 14.8 % (ref 11–15)
GLUCOSE BLD-MCNC: 221 MG/DL (ref 70–99)
GLUCOSE BLDC GLUCOMTR-MCNC: 119 MG/DL (ref 70–99)
GLUCOSE BLDC GLUCOMTR-MCNC: 122 MG/DL (ref 70–99)
GLUCOSE BLDC GLUCOMTR-MCNC: 181 MG/DL (ref 70–99)
GLUCOSE BLDC GLUCOMTR-MCNC: 216 MG/DL (ref 70–99)
HCT VFR BLD AUTO: 47.9 %
HGB BLD-MCNC: 15.4 G/DL
MAGNESIUM SERPL-MCNC: 2 MG/DL (ref 1.6–2.6)
MCH RBC QN AUTO: 28.7 PG (ref 26–34)
MCHC RBC AUTO-ENTMCNC: 32.2 G/DL (ref 31–37)
MCV RBC AUTO: 89.2 FL
OSMOLALITY SERPL CALC.SUM OF ELEC: 299 MOSM/KG (ref 275–295)
PLATELET # BLD AUTO: 624 10(3)UL (ref 150–450)
POTASSIUM SERPL-SCNC: 4.8 MMOL/L (ref 3.5–5.1)
RBC # BLD AUTO: 5.37 X10(6)UL
SODIUM SERPL-SCNC: 138 MMOL/L (ref 136–145)
WBC # BLD AUTO: 10.9 X10(3) UL (ref 4–11)

## 2024-07-12 PROCEDURE — 83735 ASSAY OF MAGNESIUM: CPT | Performed by: HOSPITALIST

## 2024-07-12 PROCEDURE — 85027 COMPLETE CBC AUTOMATED: CPT | Performed by: INTERNAL MEDICINE

## 2024-07-12 PROCEDURE — 80048 BASIC METABOLIC PNL TOTAL CA: CPT | Performed by: INTERNAL MEDICINE

## 2024-07-12 PROCEDURE — 82962 GLUCOSE BLOOD TEST: CPT

## 2024-07-12 NOTE — PAYOR COMM NOTE
--------------  ADMISSION REVIEW     Payor: [employer] [facility]  Subscriber #:  [id number]  Authorization Number: [id number]    Admit date: 7/10/24  Admit time:  9:18 PM       [facility] Hospitalist H&P         CC:       Chief Complaint   Patient presents with    Cellulitis         PCP: Brittny Patino DO     History of Present Illness: Mr. Mcgarry is a 74 year old male with PMH sig for type 2 DM, hs of left BKA, carotid artery stenosis, MDD, mobid obesity, CKD stage 3, who presents with recurrent right lower ext cellulitis.  Patient has had a history of cellulitis of his right lower extremity, recently treated with antibiotics with resolution, but had recurrence of symptoms redness and pain about 1 week ago, started on doxycycline, with minimal improvement, significant drainage surrounding the wound, therefore was recommended to come to the ER.  He denies any fever or chills, no chest pain or shortness of breath, no headaches or vision changes, no lightheadedness or dizziness, no other complaints at this time.       Diagnostic Data:    CBC/Chem       Recent Labs   Lab 07/10/24  1954 07/11/24  0319   WBC 13.5* 12.1*   HGB 16.3 15.4   MCV 85.7 86.0   .0* 605.0*              Recent Labs   Lab 07/10/24  1954 07/11/24  0319    140   K 4.5 4.5    106   CO2 26.0 27.0   BUN 28* 27*   CREATSERUM 1.55* 1.54*   * 199*   CA 9.5 9.1       ASSESSMENT / PLAN:   Mr. Mcgarry is a 74 year old male with PMH sig for type 2 DM, hs of left BKA, carotid artery stenosis, MDD, mobid obesity, CKD stage 3, who presents with recurrent right lower ext cellulitis.      Right lower extremity cellulitis  Leucocytosis   - erythema noted, not improving with doxy, WBC 13 on admit  - IV Vancomycin and zosyn started  - BC and wound cultures pending  - ID and wound care consulted  - elevated as tolerted     Type 2 DM  - A1c 8.3  - on novalog 20 TID, and treciba 40 units  - SSI and accuchecks     CKD stage 3  - cre 1.5 on admit,  at baseline  - monitor      HTN  - amlodipine 5 mg     Carotid artery disease  PAD  - see vascular Dr. Zepeda  - continue plavix     Hs of left BKA  - prosthetic in place     Depression   - continue home meds     Morbid obesity  - weight loss endevours as OP     FN:  - IVF:none  - Diet: diabetic diet     DVT Prophy:scd, heparin  Lines: PIV     Dispo: pending clinical course     Outpatient records or previous hospital records reviewed.      Further recommendations pending patient's clinical course.  [facility] hospitalist to continue to follow patient while in house     Patient and/or patient's family given opportunity to ask questions and note understanding and agreeing with therapeutic plan as outlined     Thank You,  Noah Pollack MD     Hospitalist with [facility]  Answering Service number: [phone]    7/11 ID:    Reason for Consultation:  To help with infection and treatment, requested by Dr. Pollack,      History of Present Illness:  Connor Mcgarry is a 74 year old male admitted to [facility] on 7/10 with RLE Erythema,   Significant hx of   - DM,  - Hx of L BKA 10 years ago, was colonized with MRSA and PSAR at that time too,   - Morbid obesity,   - Hx of Cellulitis of LE in the past,   Came to the hospital for new Cellulitis of RLE, erythema was going on for a week PTA,  He was on PO Doxy as OP for four days with out much improvement,   Had some blisters which desquamated,   Also had significant drainage from the LE,  No fever, chills,  Secondary to all of the above he decided to come to the hospital,   ID is now asked to help with infection and treatment,     Assessment and Plan:     RLE Cellulitis with desquamated blisters: copious drainage from the leg,   - No fever, chills,   - Drainage cul with + GS, no cul growth so far. ? Use of abx PTA  - Blood Cul are NGTD   - Wound care to see,   - Historically grew MRSA and PSAR in the past,   - Agree with IV Vanc and Zosyn, will de escalate soon,   - Leg elevation,   - Hx of  LLE BKA 10 years ago, uses prosthesis,      2.   Leukocytosis: sec to above, will trend,      3.   DM: Needs tighter control,      4.    Disposition: in house, an elderly male with hx of Cellulitis admitted with RLE Cellulitis with blistering and drainage,   - Follow Cul,   - Continue IV Vanc and IV Zosyn, pharm to dose,   - Compression wraps per Wound care,   - Leg elevation,   - Tighter control of DM,     Discussed with patient, RN, all questions answered, further recommendations to follow,   Thank you for consulting [facility] ID for Connor Mcgarry.  If you have any questions or concerns please call [facility] Infectious Disease at [phone].      Gadiel Katz MD  7/11/2024  3:21 PM                    7/12 HOSPITALIST:    [facility] Hospitalist Progress Note      CC: Hospital Follow up     PCP: Brittny Patino DO         Assessment/Plan:      Principal Problem:    Cellulitis of right lower extremity     Mr. Mcgarry is a 74 year old male with PMH sig for type 2 DM, hs of left BKA, carotid artery stenosis, MDD, mobid obesity, CKD stage 3, who presents with recurrent right lower ext cellulitis.      Right lower extremity cellulitis  Leucocytosis   - erythema noted, not improving with doxy, WBC 13 on admit  - IV Vancomycin and zosyn started  - BC and wound cultures pending  - ID and wound care consulted  - elevated as tolerted     Type 2 DM  - A1c 8.3  - on novalog 20 TID, and treciba 40 units  - SSI and accuchecks     Mild KARINA on CKD stage 3  - cre 1.5 on admit, at baseline  - oral hydration   - monitor      HTN  - amlodipine 5 mg     Carotid artery disease  PAD  - see vascular Dr. Zepeda  - continue plavix     Hs of left BKA  - prosthetic in place     Depression   - continue home meds     Morbid obesity  - weight loss endevours as OP     FN:  - IVF: none  - Diet: diabetic diet     DVT Prophy:scd, heparin  Lines: PIV     Dispo: pending clinical course     7/12 ID:    Subjective:  Patient seen and examined sitting up  in bedside chair. Feeling better today. Redness and swelling of RLE improving. Still with some tenderness. Tolerating IV antibiotic therapy thus far. Denies F/C. Denies n/v/d. Otherwise no new complaints.      Assessment and Plan:  1.  RLE cellulitis with desquamated blisters and copious drainage  - Blood cultures are negative thus far.  - Drainage cultures isolating Staph aureus thus far.  - Cultures historically isolating MRSA and Pseudomonas aeruginosa.  - H/o L BKA 10 years ago, uses prosthesis.  - IV vancomycin + Zosyn, ongoing.     2.  Leukocytosis secondary to the above  - WBC normalized as of this AM at 10.9K <- 12.1K <- 13.5K  - Will trend.     3.  DM II  - Optimal glycemic control.     4.  Recs  - Continue IV vancomycin + Zosyn at this time.  - F/u WBC and fever curve.  - F/u cultures/sensitivities.  - Continue local wound care.  - Supportive care as per the primary team.  - Discussed plan of care with primary team attending physician, Dr. Jonathan Amado.  - Discussed plan of care with nursing.  - Discussed plan of care with patient.  All questions addressed understanding verbalized.  - Further recommendations pending clinical course.     Discussed case with ID attending/collaborating physician, Dr. Gadiel Katz, who is in agreement with the above plan of care.      If you have any questions or concerns please call [facility] Infectious Disease at [phone].      Brandin Vences, APRN     7/12/2024      MEDICATIONS ADMINISTERED IN LAST 1 DAY:  amLODIPine (Norvasc) tab 5 mg       Date Action Dose Route User    7/12/2024 0824 Given 5 mg Oral Bree Pickard RN          buPROPion ER (Wellbutrin XL) 24 hr tab 150 mg       Date Action Dose Route User    7/12/2024 0824 Given 150 mg Oral Bree Pickard RN          clopidogrel (Plavix) tab 75 mg       Date Action Dose Route User    7/12/2024 0824 Given 75 mg Oral Bree Pickard RN          gabapentin (Neurontin) cap 100 mg       Date Action Dose Route  User    7/12/2024 0824 Given 100 mg Oral Bree Pickard RN    7/11/2024 2142 Given 100 mg Oral Caitlyn Leblanc RN    7/11/2024 1636 Given 100 mg Oral Guadalupe Claros RN          gemfibrozil (Lopid) tab 600 mg       Date Action Dose Route User    7/12/2024 0824 Given 600 mg Oral Bree Pickard RN          heparin (Porcine) 5000 UNIT/ML injection 5,000 Units       Date Action Dose Route User    7/12/2024 0530 Given 5,000 Units Subcutaneous (Right Lower Abdomen) Caitlyn Leblanc RN          insulin aspart (NovoLOG) 100 Units/mL FlexPen 1-5 Units       Date Action Dose Route User    7/12/2024 0824 Given 2 Units Subcutaneous (Left Lower Abdomen) Bree Pickard RN          insulin aspart (NovoLOG) 100 Units/mL FlexPen 20 Units       Date Action Dose Route User    7/12/2024 1306 Given 20 Units Subcutaneous (Left Lower Abdomen) Bree Pickard RN    7/12/2024 0824 Given 20 Units Subcutaneous (Left Lower Abdomen) Bree Pickard RN    7/11/2024 1636 Given 20 Units Subcutaneous (Right Lower Abdomen) Guadalupe Claros RN          insulin degludec (Tresiba) 100 units/mL flextouch 40 Units       Date Action Dose Route User    7/11/2024 2142 Given 40 Units Subcutaneous (Left Lower Abdomen) Caitlyn Leblanc RN          pantoprazole (Protonix) DR tab 40 mg       Date Action Dose Route User    7/12/2024 0530 Given 40 mg Oral Caitlyn Leblanc RN          piperacillin-tazobactam (Zosyn) 3.375 g in dextrose 5% 100 mL IVPB-ADDV       Date Action Dose Route User    7/12/2024 0530 New Bag 3.375 g Intravenous Caitlyn Leblanc RN    7/11/2024 2143 New Bag 3.375 g Intravenous Caitlyn Leblanc RN    7/11/2024 1452 New Bag 3.375 g Intravenous Guadalupe Claros RN          rosuvastatin (Crestor) tab 20 mg       Date Action Dose Route User    7/11/2024 2142 Given 20 mg Oral Caitlyn Leblanc RN            Vitals (last day)       Date/Time Temp Pulse Resp BP SpO2 Weight O2 Device O2 Flow Rate (L/min) [facility]    07/12/24 0814 97.8 °F  (36.6 °C) 78 18 141/57 95 % -- None (Room air) -- YD    07/12/24 0528 97.8 °F (36.6 °C) 73 18 132/87 92 % -- None (Room air) -- EM    07/12/24 0210 98.1 °F (36.7 °C) 93 18 144/84 92 % -- None (Room air) -- EM    07/11/24 2139 98.2 °F (36.8 °C) 82 18 125/72 92 % -- None (Room air) -- EM    07/11/24 1400 99 °F (37.2 °C) -- 18 110/65 92 % -- None (Room air) -- AP    07/11/24 0800 98.3 °F (36.8 °C) -- 18 119/60 92 % -- None (Room air) -- AP    07/11/24 0610 97.8 °F (36.6 °C) 84 18 131/66 92 % -- None (Room air) -- EM    07/11/24 0000 -- 91 -- -- -- -- -- -- MM

## 2024-07-12 NOTE — PROGRESS NOTES
[facility]  part of [facility] Infectious Disease  Progress Note    Connor Mcgarry Patient Status:  Inpatient    1950 MRN [id number]   Location [facility] 5SW/SE Attending Jonathan Amado MD   Hosp Day # 2 PCP Brittny Patino,      Subjective:  Patient seen and examined sitting up in bedside chair. Feeling better today. Redness and swelling of RLE improving. Still with some tenderness. Tolerating IV antibiotic therapy thus far. Denies F/C. Denies n/v/d. Otherwise no new complaints.     Objective:  Blood pressure 141/57, pulse 78, temperature 97.8 °F (36.6 °C), temperature source Oral, resp. rate 18, height 5' 6\" (1.676 m), weight 287 lb 8 oz (130.4 kg), SpO2 95%.    Intake/Output:    Intake/Output Summary (Last 24 hours) at 2024 1219  Last data filed at 2024 0819  Gross per 24 hour   Intake 810 ml   Output --   Net 810 ml       Physical Exam:  General: Awake, alert, non-tox, NAD.  HEENT:  Oropharynx clear, trachea ML.  Heart: RRR S1S2 no murmurs.  Lungs: Essentially CTA b/l, no rhonchi, rales, wheezes.  Abdomen: Soft, NT/ND.  BS present.  No organomegaly.  Extremity: RLE with improving erythema. Still with some warmth appreciated and mild TTP. Desquamated blisters appear stable.   Neurological: No focal deficits.  Derm:  Warm and dry.    Lab Data Review:  Lab Results   Component Value Date    WBC 10.9 2024    HGB 15.4 2024    HCT 47.9 2024    .0 2024    CREATSERUM 1.74 2024    BUN 29 2024     2024    K 4.8 2024     2024    CO2 26.0 2024     2024    CA 9.5 2024    MG 2.0 2024        Cultures:  Hospital Encounter on 07/10/24   1. Blood Culture     Status: None (Preliminary result)    Collection Time: 07/10/24  8:28 PM    Specimen: Blood,peripheral   Result Value Ref Range    Blood Culture Result No Growth 1 Day N/A   2. Aerobic Bacterial  Culture     Status: Abnormal (Preliminary result)    Collection Time: 07/10/24  7:22 PM    Specimen: Leg; Other   Result Value Ref Range    Aerobic Culture Result 1+ growth Staphylococcus aureus (A) N/A    Aerobic Smear 2+ WBCs seen N/A    Aerobic Smear No organisms seen N/A       Radiology:  No results found.      Assessment and Plan:  1.  RLE cellulitis with desquamated blisters and copious drainage  - Blood cultures are negative thus far.  - Drainage cultures isolating Staph aureus thus far.  - Cultures historically isolating MRSA and Pseudomonas aeruginosa.  - H/o L BKA 10 years ago, uses prosthesis.  - IV vancomycin + Zosyn, ongoing.    2.  Leukocytosis secondary to the above  - WBC normalized as of this AM at 10.9K <- 12.1K <- 13.5K  - Will trend.    3.  DM II  - Optimal glycemic control.    4.  Recs  - Continue IV vancomycin + Zosyn at this time.  - F/u WBC and fever curve.  - F/u cultures/sensitivities.  - Continue local wound care.  - Supportive care as per the primary team.  - Discussed plan of care with primary team attending physician, Dr. Jonathan Amado.  - Discussed plan of care with nursing.  - Discussed plan of care with patient.  All questions addressed understanding verbalized.  - Further recommendations pending clinical course.    Discussed case with ID attending/collaborating physician, Dr. Gadiel Katz, who is in agreement with the above plan of care.    Please note that this report has been produced using speech recognition software and may contain errors related to that system including, but not limited to, errors in grammar, punctuation, and spelling, as well as words and phrases that possibly may have been recognized inappropriately.  If there are any questions or concerns, contact the dictating provider for clarification.     The 21st Century Cures Act makes medical notes like these available to patients in the interest of transparency. Please be advised this is a medical document. Medical  documents are intended to carry relevant information, facts as evident, and the clinical opinion of the practitioner. The medical note is intended as peer to peer communication and may appear blunt or direct. It is written in medical language and may contain abbreviations or verbiage that are unfamiliar.     If you have any questions or concerns please call [facility] Infectious Disease at [phone].     SARAH Tang    7/12/2024  12:19 PM

## 2024-07-12 NOTE — PLAN OF CARE
Problem: Patient Centered Care  Goal: Patient preferences are identified and integrated in the patient's plan of care  Description: Interventions:  - What would you like us to know as we care for you? Home with daughter  - Provide timely, complete, and accurate information to patient/family  - Incorporate patient and family knowledge, values, beliefs, and cultural backgrounds into the planning and delivery of care  - Encourage patient/family to participate in care and decision-making at the level they choose  - Honor patient and family perspectives and choices     Problem: Diabetes/Glucose Control  Goal: Glucose maintained within prescribed range  Description: INTERVENTIONS:  - Monitor Blood Glucose as ordered  - Assess for signs and symptoms of hyperglycemia and hypoglycemia  - Administer ordered medications to maintain glucose within target range  - Assess barriers to adequate nutritional intake and initiate nutrition consult as needed  - Instruct patient on self management of diabetes    Problem: PAIN - ADULT  Goal: Verbalizes/displays adequate comfort level or patient's stated pain goal  Description: INTERVENTIONS:  - Encourage pt to monitor pain and request assistance  - Assess pain using appropriate pain scale  - Administer analgesics based on type and severity of pain and evaluate response  - Implement non-pharmacological measures as appropriate and evaluate response  - Consider cultural and social influences on pain and pain management  - Manage/alleviate anxiety  - Utilize distraction and/or relaxation techniques  - Monitor for opioid side effects  - Notify MD/LIP if interventions unsuccessful or patient reports new pain  - Anticipate increased pain with activity and pre-medicate as appropriate    Problem: RISK FOR INFECTION - ADULT  Goal: Absence of fever/infection during anticipated neutropenic period  Description: INTERVENTIONS  - Monitor WBC  - Administer growth factors as ordered  - Implement  neutropenic guidelines       Problem: DISCHARGE PLANNING  Goal: Discharge to home or other facility with appropriate resources  Description: INTERVENTIONS:  - Identify barriers to discharge w/pt and caregiver  - Include patient/family/discharge partner in discharge planning  - Arrange for needed discharge resources and transportation as appropriate  - Identify discharge learning needs (meds, wound care, etc)  - Arrange for interpreters to assist at discharge as needed  - Consider post-discharge preferences of patient/family/discharge partner  - Complete POLST form as appropriate  - Assess patient's ability to be responsible for managing their own health  - Refer to Case Management Department for coordinating discharge planning if the patient needs post-hospital services based on physician/LIP order or complex needs related to functional status, cognitive ability or social support system    Problem: METABOLIC/FLUID AND ELECTROLYTES - ADULT  Goal: Glucose maintained within prescribed range  Description: INTERVENTIONS:  - Monitor Blood Glucose as ordered  - Assess for signs and symptoms of hyperglycemia and hypoglycemia  - Administer ordered medications to maintain glucose within target range  - Assess barriers to adequate nutritional intake and initiate nutrition consult as needed  - Instruct patient on self management of diabetes    Goal: Electrolytes maintained within normal limits  Description: INTERVENTIONS:  - Monitor labs and rhythm and assess patient for signs and symptoms of electrolyte imbalances  - Administer electrolyte replacement as ordered  - Monitor response to electrolyte replacements, including rhythm and repeat lab results as appropriate  - Fluid restriction as ordered  - Instruct patient on fluid and nutrition restrictions as appropriate    Goal: Hemodynamic stability and optimal renal function maintained  Description: INTERVENTIONS:  - Monitor labs and assess for signs and symptoms of volume excess  or deficit  - Monitor intake, output and patient weight  - Monitor urine specific gravity, serum osmolarity and serum sodium as indicated or ordered  - Monitor response to interventions for patient's volume status, including labs, urine output, blood pressure (other measures as available)  - Encourage oral intake as appropriate  - Instruct patient on fluid and nutrition restrictions as appropriate    Problem: SKIN/TISSUE INTEGRITY - ADULT  Goal: Skin integrity remains intact  Description: INTERVENTIONS  - Assess and document risk factors for pressure ulcer development  - Assess and document skin integrity  - Monitor for areas of redness and/or skin breakdown  - Initiate interventions, skin care algorithm/standards of care as needed    Goal: Incision(s), wounds(s) or drain site(s) healing without S/S of infection  Description: INTERVENTIONS:  - Assess and document risk factors for pressure ulcer development  - Assess and document skin integrity  - Assess and document dressing/incision, wound bed, drain sites and surrounding tissue  - Implement wound care per orders  - Initiate isolation precautions as appropriate  - Initiate Pressure Ulcer prevention bundle as indicated       Problem: HEMATOLOGIC - ADULT  Goal: Maintains hematologic stability  Description: INTERVENTIONS  - Assess for signs and symptoms of bleeding or hemorrhage  - Monitor labs and vital signs for trends  - Administer supportive blood products/factors, fluids and medications as ordered and appropriate  - Administer supportive blood products/factors as ordered and appropriate  Scheduled medications given- see MAR, bed at lowest position, belongings and call light within reach. Frequent staff rounding.

## 2024-07-12 NOTE — PROGRESS NOTES
[facility] Hospitalist Progress Note     CC: Hospital Follow up    PCP: Brittny Patino DO       Assessment/Plan:     Principal Problem:    Cellulitis of right lower extremity    Mr. Mcgarry is a 74 year old male with PMH sig for type 2 DM, hs of left BKA, carotid artery stenosis, MDD, mobid obesity, CKD stage 3, who presents with recurrent right lower ext cellulitis.      Right lower extremity cellulitis  Leucocytosis   - erythema noted, not improving with doxy, WBC 13 on admit  - IV Vancomycin and zosyn started  - BC and wound cultures pending  - ID and wound care consulted  - elevated as tolerted     Type 2 DM  - A1c 8.3  - on novalog 20 TID, and treciba 40 units  - SSI and accuchecks     Mild KARINA on CKD stage 3  - cre 1.5 on admit, at baseline  - oral hydration   - monitor      HTN  - amlodipine 5 mg     Carotid artery disease  PAD  - see vascular Dr. Zepeda  - continue plavix     Hs of left BKA  - prosthetic in place     Depression   - continue home meds     Morbid obesity  - weight loss endevours as OP     FN:  - IVF: none  - Diet: diabetic diet     DVT Prophy:scd, heparin  Lines: PIV     Dispo: pending clinical course     Outpatient records or previous hospital records reviewed.      Further recommendations pending patient's clinical course.  [facility] hospitalist to continue to follow patient while in house     Patient and/or patient's family given opportunity to ask questions and note understanding and agreeing with therapeutic plan as outlined     Thank You,  Jonathan Amado MD     Hospitalist with [facility]  Answering Service number: [phone]     Subjective:     No CP, SOB, or N/V.  Not much pain in the left leg.   Redness has improved.     OBJECTIVE:    Blood pressure 141/57, pulse 78, temperature 97.8 °F (36.6 °C), temperature source Oral, resp. rate 18, height 5' 6\" (1.676 m), weight 287 lb 8 oz (130.4 kg), SpO2 95%.    Temp:  [97.8 °F (36.6 °C)-99 °F (37.2 °C)] 97.8 °F (36.6 °C)  Pulse:  [73-93]  78  Resp:  [18] 18  BP: (110-144)/(57-87) 141/57  SpO2:  [92 %-95 %] 95 %      Intake/Output:    Intake/Output Summary (Last 24 hours) at 7/12/2024 1112  Last data filed at 7/12/2024 0819  Gross per 24 hour   Intake 810 ml   Output --   Net 810 ml       Last 3 Weights   07/10/24 2121 287 lb 8 oz (130.4 kg)   07/10/24 1954 293 lb 3.4 oz (133 kg)   01/14/22 1054 277 lb 11.2 oz (126 kg)   10/12/21 0856 300 lb (136.1 kg)       Exam   General:  Alert, no distress   Head:  NC/AT   Eyes:  Sclera anicteric, No conjunctival pallor,     Nose: Nares normal. Mucosa normal.    Throat: Lips normal. Teeth and gums normal.   Neck: Supple   Lungs:   Clear to auscultation bilaterally. Normal effort   Chest wall:  No tenderness or deformity.   Heart:  Regular rate and rhythm, S1, S2 normal   Abdomen:   Soft, non-tender. Bowel sounds normal. Non distended   Extremities: Right lower with dressing, redness has improved, left lower ext with prosthetic    Skin: Skin color, texture, turgor normal. No rashes or lesions.    Neurologic: Normal strength, no focal deficit appreciated         Data Review:       Labs:     Recent Labs   Lab 07/10/24  1954 07/11/24  0319 07/12/24  0550   RBC 5.61 5.35 5.37   HGB 16.3 15.4 15.4   HCT 48.1 46.0 47.9   MCV 85.7 86.0 89.2   MCH 29.1 28.8 28.7   MCHC 33.9 33.5 32.2   RDW 14.6 14.6 14.8   NEPRELIM 9.25*  --   --    WBC 13.5* 12.1* 10.9   .0* 605.0* 624.0*         Recent Labs   Lab 07/10/24  1954 07/11/24  0319 07/12/24  0550   * 199* 221*   BUN 28* 27* 29*   CREATSERUM 1.55* 1.54* 1.74*   EGFRCR 46* 47* 40*   CA 9.5 9.1 9.5    140 138   K 4.5 4.5 4.8    106 106   CO2 26.0 27.0 26.0       No results for input(s): \"ALT\", \"AST\", \"ALB\", \"AMYLASE\", \"LIPASE\", \"LDH\" in the last 168 hours.    Invalid input(s): \"ALPHOS\", \"TBIL\", \"DBIL\", \"TPROT\"      Imaging:  No results found.      Meds:      heparin  5,000 Units Subcutaneous Q8H JAYDEN    vancomycin  1,500 mg Intravenous Q24H     piperacillin-tazobactam  3.375 g Intravenous Q8H    amLODIPine  5 mg Oral Daily    buPROPion ER  150 mg Oral Daily    gabapentin  100 mg Oral TID    pantoprazole  40 mg Oral QAM AC    insulin degludec  40 Units Subcutaneous Nightly    clopidogrel  75 mg Oral Daily    gemfibrozil  600 mg Oral Daily    rosuvastatin  20 mg Oral Nightly    insulin aspart  1-5 Units Subcutaneous TID CC    insulin aspart  20 Units Subcutaneous TID CC         acetaminophen    melatonin    polyethylene glycol (PEG 3350)    sennosides    bisacodyl    ondansetron    acetaminophen    glucose **OR** glucose **OR** glucose-vitamin C **OR** dextrose **OR** glucose **OR** glucose **OR** glucose-vitamin C

## 2024-07-12 NOTE — PLAN OF CARE
Problem: Patient Centered Care  Goal: Patient preferences are identified and integrated in the patient's plan of care  Description: Interventions:  - What would you like us to know as we care for you? Home with daughter  - Provide timely, complete, and accurate information to patient/family  - Incorporate patient and family knowledge, values, beliefs, and cultural backgrounds into the planning and delivery of care  - Encourage patient/family to participate in care and decision-making at the level they choose  - Honor patient and family perspectives and choices  Outcome: Progressing     Problem: Diabetes/Glucose Control  Goal: Glucose maintained within prescribed range  Description: INTERVENTIONS:  - Monitor Blood Glucose as ordered  - Assess for signs and symptoms of hyperglycemia and hypoglycemia  - Administer ordered medications to maintain glucose within target range  - Assess barriers to adequate nutritional intake and initiate nutrition consult as needed  - Instruct patient on self management of diabetes  Outcome: Progressing     Problem: PAIN - ADULT  Goal: Verbalizes/displays adequate comfort level or patient's stated pain goal  Description: INTERVENTIONS:  - Encourage pt to monitor pain and request assistance  - Assess pain using appropriate pain scale  - Administer analgesics based on type and severity of pain and evaluate response  - Implement non-pharmacological measures as appropriate and evaluate response  - Consider cultural and social influences on pain and pain management  - Manage/alleviate anxiety  - Utilize distraction and/or relaxation techniques  - Monitor for opioid side effects  - Notify MD/LIP if interventions unsuccessful or patient reports new pain  - Anticipate increased pain with activity and pre-medicate as appropriate  Outcome: Progressing     Problem: RISK FOR INFECTION - ADULT  Goal: Absence of fever/infection during anticipated neutropenic period  Description: INTERVENTIONS  -  Monitor WBC  - Administer growth factors as ordered  - Implement neutropenic guidelines  Outcome: Progressing     Problem: DISCHARGE PLANNING  Goal: Discharge to home or other facility with appropriate resources  Description: INTERVENTIONS:  - Identify barriers to discharge w/pt and caregiver  - Include patient/family/discharge partner in discharge planning  - Arrange for needed discharge resources and transportation as appropriate  - Identify discharge learning needs (meds, wound care, etc)  - Arrange for interpreters to assist at discharge as needed  - Consider post-discharge preferences of patient/family/discharge partner  - Complete POLST form as appropriate  - Assess patient's ability to be responsible for managing their own health  - Refer to Case Management Department for coordinating discharge planning if the patient needs post-hospital services based on physician/LIP order or complex needs related to functional status, cognitive ability or social support system  Outcome: Progressing     Problem: METABOLIC/FLUID AND ELECTROLYTES - ADULT  Goal: Glucose maintained within prescribed range  Description: INTERVENTIONS:  - Monitor Blood Glucose as ordered  - Assess for signs and symptoms of hyperglycemia and hypoglycemia  - Administer ordered medications to maintain glucose within target range  - Assess barriers to adequate nutritional intake and initiate nutrition consult as needed  - Instruct patient on self management of diabetes  Outcome: Progressing  Goal: Electrolytes maintained within normal limits  Description: INTERVENTIONS:  - Monitor labs and rhythm and assess patient for signs and symptoms of electrolyte imbalances  - Administer electrolyte replacement as ordered  - Monitor response to electrolyte replacements, including rhythm and repeat lab results as appropriate  - Fluid restriction as ordered  - Instruct patient on fluid and nutrition restrictions as appropriate  Outcome: Progressing  Goal:  Hemodynamic stability and optimal renal function maintained  Description: INTERVENTIONS:  - Monitor labs and assess for signs and symptoms of volume excess or deficit  - Monitor intake, output and patient weight  - Monitor urine specific gravity, serum osmolarity and serum sodium as indicated or ordered  - Monitor response to interventions for patient's volume status, including labs, urine output, blood pressure (other measures as available)  - Encourage oral intake as appropriate  - Instruct patient on fluid and nutrition restrictions as appropriate  Outcome: Progressing     Problem: SKIN/TISSUE INTEGRITY - ADULT  Goal: Skin integrity remains intact  Description: INTERVENTIONS  - Assess and document risk factors for pressure ulcer development  - Assess and document skin integrity  - Monitor for areas of redness and/or skin breakdown  - Initiate interventions, skin care algorithm/standards of care as needed  Outcome: Progressing  Goal: Incision(s), wounds(s) or drain site(s) healing without S/S of infection  Description: INTERVENTIONS:  - Assess and document risk factors for pressure ulcer development  - Assess and document skin integrity  - Assess and document dressing/incision, wound bed, drain sites and surrounding tissue  - Implement wound care per orders  - Initiate isolation precautions as appropriate  - Initiate Pressure Ulcer prevention bundle as indicated  Outcome: Progressing     Problem: HEMATOLOGIC - ADULT  Goal: Maintains hematologic stability  Description: INTERVENTIONS  - Assess for signs and symptoms of bleeding or hemorrhage  - Monitor labs and vital signs for trends  - Administer supportive blood products/factors, fluids and medications as ordered and appropriate  - Administer supportive blood products/factors as ordered and appropriate  Outcome: Progressing

## 2024-07-13 LAB
ANION GAP SERPL CALC-SCNC: 6 MMOL/L (ref 0–18)
BUN BLD-MCNC: 25 MG/DL (ref 9–23)
BUN/CREAT SERPL: 15.9 (ref 10–20)
CALCIUM BLD-MCNC: 9 MG/DL (ref 8.7–10.4)
CHLORIDE SERPL-SCNC: 107 MMOL/L (ref 98–112)
CO2 SERPL-SCNC: 26 MMOL/L (ref 21–32)
CREAT BLD-MCNC: 1.57 MG/DL
DEPRECATED RDW RBC AUTO: 49 FL (ref 35.1–46.3)
EGFRCR SERPLBLD CKD-EPI 2021: 46 ML/MIN/1.73M2 (ref 60–?)
ERYTHROCYTE [DISTWIDTH] IN BLOOD BY AUTOMATED COUNT: 14.9 % (ref 11–15)
GLUCOSE BLD-MCNC: 185 MG/DL (ref 70–99)
GLUCOSE BLDC GLUCOMTR-MCNC: 154 MG/DL (ref 70–99)
GLUCOSE BLDC GLUCOMTR-MCNC: 172 MG/DL (ref 70–99)
GLUCOSE BLDC GLUCOMTR-MCNC: 189 MG/DL (ref 70–99)
GLUCOSE BLDC GLUCOMTR-MCNC: 198 MG/DL (ref 70–99)
HCT VFR BLD AUTO: 45.6 %
HGB BLD-MCNC: 14.4 G/DL
MCH RBC QN AUTO: 28.4 PG (ref 26–34)
MCHC RBC AUTO-ENTMCNC: 31.6 G/DL (ref 31–37)
MCV RBC AUTO: 89.9 FL
OSMOLALITY SERPL CALC.SUM OF ELEC: 297 MOSM/KG (ref 275–295)
PLATELET # BLD AUTO: 609 10(3)UL (ref 150–450)
POTASSIUM SERPL-SCNC: 4.5 MMOL/L (ref 3.5–5.1)
RBC # BLD AUTO: 5.07 X10(6)UL
SODIUM SERPL-SCNC: 139 MMOL/L (ref 136–145)
VANCOMYCIN PEAK SERPL-MCNC: 19.1 UG/ML (ref 30–50)
WBC # BLD AUTO: 11.7 X10(3) UL (ref 4–11)

## 2024-07-13 PROCEDURE — 80048 BASIC METABOLIC PNL TOTAL CA: CPT | Performed by: INTERNAL MEDICINE

## 2024-07-13 PROCEDURE — 80202 ASSAY OF VANCOMYCIN: CPT | Performed by: INTERNAL MEDICINE

## 2024-07-13 PROCEDURE — 82962 GLUCOSE BLOOD TEST: CPT

## 2024-07-13 PROCEDURE — 85027 COMPLETE CBC AUTOMATED: CPT | Performed by: INTERNAL MEDICINE

## 2024-07-13 RX ORDER — VANCOMYCIN HYDROCHLORIDE
1500 EVERY 24 HOURS
Status: DISCONTINUED | OUTPATIENT
Start: 2024-07-14 | End: 2024-07-14

## 2024-07-13 NOTE — PLAN OF CARE
Problem: Patient Centered Care  Goal: Patient preferences are identified and integrated in the patient's plan of care  Description: Interventions:  - What would you like us to know as we care for you? Home with daughter  - Provide timely, complete, and accurate information to patient/family  - Incorporate patient and family knowledge, values, beliefs, and cultural backgrounds into the planning and delivery of care  - Encourage patient/family to participate in care and decision-making at the level they choose  - Honor patient and family perspectives and choices  Outcome: Progressing     Problem: Diabetes/Glucose Control  Goal: Glucose maintained within prescribed range  Description: INTERVENTIONS:  - Monitor Blood Glucose as ordered  - Assess for signs and symptoms of hyperglycemia and hypoglycemia  - Administer ordered medications to maintain glucose within target range  - Assess barriers to adequate nutritional intake and initiate nutrition consult as needed  - Instruct patient on self management of diabetes  Outcome: Progressing     Problem: PAIN - ADULT  Goal: Verbalizes/displays adequate comfort level or patient's stated pain goal  Description: INTERVENTIONS:  - Encourage pt to monitor pain and request assistance  - Assess pain using appropriate pain scale  - Administer analgesics based on type and severity of pain and evaluate response  - Implement non-pharmacological measures as appropriate and evaluate response  - Consider cultural and social influences on pain and pain management  - Manage/alleviate anxiety  - Utilize distraction and/or relaxation techniques  - Monitor for opioid side effects  - Notify MD/LIP if interventions unsuccessful or patient reports new pain  - Anticipate increased pain with activity and pre-medicate as appropriate  Outcome: Progressing     Problem: RISK FOR INFECTION - ADULT  Goal: Absence of fever/infection during anticipated neutropenic period  Description: INTERVENTIONS  -  Monitor WBC  - Administer growth factors as ordered  - Implement neutropenic guidelines  Outcome: Progressing     Problem: DISCHARGE PLANNING  Goal: Discharge to home or other facility with appropriate resources  Description: INTERVENTIONS:  - Identify barriers to discharge w/pt and caregiver  - Include patient/family/discharge partner in discharge planning  - Arrange for needed discharge resources and transportation as appropriate  - Identify discharge learning needs (meds, wound care, etc)  - Arrange for interpreters to assist at discharge as needed  - Consider post-discharge preferences of patient/family/discharge partner  - Complete POLST form as appropriate  - Assess patient's ability to be responsible for managing their own health  - Refer to Case Management Department for coordinating discharge planning if the patient needs post-hospital services based on physician/LIP order or complex needs related to functional status, cognitive ability or social support system  Outcome: Progressing     Problem: METABOLIC/FLUID AND ELECTROLYTES - ADULT  Goal: Glucose maintained within prescribed range  Description: INTERVENTIONS:  - Monitor Blood Glucose as ordered  - Assess for signs and symptoms of hyperglycemia and hypoglycemia  - Administer ordered medications to maintain glucose within target range  - Assess barriers to adequate nutritional intake and initiate nutrition consult as needed  - Instruct patient on self management of diabetes  Outcome: Progressing  Goal: Electrolytes maintained within normal limits  Description: INTERVENTIONS:  - Monitor labs and rhythm and assess patient for signs and symptoms of electrolyte imbalances  - Administer electrolyte replacement as ordered  - Monitor response to electrolyte replacements, including rhythm and repeat lab results as appropriate  - Fluid restriction as ordered  - Instruct patient on fluid and nutrition restrictions as appropriate  Outcome: Progressing  Goal:  Hemodynamic stability and optimal renal function maintained  Description: INTERVENTIONS:  - Monitor labs and assess for signs and symptoms of volume excess or deficit  - Monitor intake, output and patient weight  - Monitor urine specific gravity, serum osmolarity and serum sodium as indicated or ordered  - Monitor response to interventions for patient's volume status, including labs, urine output, blood pressure (other measures as available)  - Encourage oral intake as appropriate  - Instruct patient on fluid and nutrition restrictions as appropriate  Outcome: Progressing     Problem: SKIN/TISSUE INTEGRITY - ADULT  Goal: Skin integrity remains intact  Description: INTERVENTIONS  - Assess and document risk factors for pressure ulcer development  - Assess and document skin integrity  - Monitor for areas of redness and/or skin breakdown  - Initiate interventions, skin care algorithm/standards of care as needed  Outcome: Progressing  Goal: Incision(s), wounds(s) or drain site(s) healing without S/S of infection  Description: INTERVENTIONS:  - Assess and document risk factors for pressure ulcer development  - Assess and document skin integrity  - Assess and document dressing/incision, wound bed, drain sites and surrounding tissue  - Implement wound care per orders  - Initiate isolation precautions as appropriate  - Initiate Pressure Ulcer prevention bundle as indicated  Outcome: Progressing     Problem: HEMATOLOGIC - ADULT  Goal: Maintains hematologic stability  Description: INTERVENTIONS  - Assess for signs and symptoms of bleeding or hemorrhage  - Monitor labs and vital signs for trends  - Administer supportive blood products/factors, fluids and medications as ordered and appropriate  - Administer supportive blood products/factors as ordered and appropriate  Outcome: Progressing     Monitoring vital signs- stable at this time. No acute changes noted at this time. Antibiotics per order. Dressing changed.  Safety and  fall precautions maintained- bed alarm on, bed locked in lowest position, call light within reach. Frequent rounding by nursing staff.

## 2024-07-13 NOTE — PROGRESS NOTES
[facility] Hospitalist Progress Note     CC: Hospital Follow up    PCP: Brittny Patino DO       Assessment/Plan:     Principal Problem:    Cellulitis of right lower extremity    Mr. Mcgarry is a 74 year old male with PMH sig for type 2 DM, hs of left BKA, carotid artery stenosis, MDD, mobid obesity, CKD stage 3, who presents with recurrent right lower ext cellulitis with MRSA.      Right lower extremity cellulitis  Leucocytosis   MRSA  - erythema noted, not improving with doxy, WBC 13 on admit  - IV Vancomycin and zosyn started  - will continue with Vanco   - BC pending and wound cultures MRSA   - ID and wound care consulted  - elevated as tolerted     Type 2 DM  - A1c 8.3  - on novalog 20 TID, and treciba 40 units  - SSI and accuchecks     Mild KARINA on CKD stage 3  - cre 1.5 on admit, at baseline range   - oral hydration   - monitor      HTN  - amlodipine 5 mg     Carotid artery disease  PAD  - see vascular Dr. Zepeda  - continue plavix     Hs of left BKA  - prosthetic in place     Depression   - continue home meds     Morbid obesity  - weight loss endevours as OP     FN:  - IVF: none  - Diet: diabetic diet     DVT Prophy: scd, heparin  Lines: PIV     Dispo: pending clinical course     Outpatient records or previous hospital records reviewed.      Further recommendations pending patient's clinical course.  [facility] hospitalist to continue to follow patient while in house     Patient and/or patient's family given opportunity to ask questions and note understanding and agreeing with therapeutic plan as outlined     Thank You,  Jonathan Amado MD     Hospitalist with [facility]  Answering Service number: [phone]     Subjective:     No CP, SOB, or N/V.  Not much pain in the left leg.   Redness has improved.     OBJECTIVE:    Blood pressure 126/67, pulse 86, temperature 97.4 °F (36.3 °C), temperature source Oral, resp. rate 18, height 5' 6\" (1.676 m), weight 287 lb 8 oz (130.4 kg), SpO2 93%.    Temp:  [97.4 °F (36.3  °C)-98 °F (36.7 °C)] 97.4 °F (36.3 °C)  Pulse:  [82-86] 86  Resp:  [16-19] 18  BP: (126-150)/(62-77) 126/67  SpO2:  [92 %-95 %] 93 %      Intake/Output:    Intake/Output Summary (Last 24 hours) at 7/13/2024 1229  Last data filed at 7/13/2024 0800  Gross per 24 hour   Intake 840 ml   Output --   Net 840 ml       Last 3 Weights   07/10/24 2121 287 lb 8 oz (130.4 kg)   07/10/24 1954 293 lb 3.4 oz (133 kg)   01/14/22 1054 277 lb 11.2 oz (126 kg)   10/12/21 0856 300 lb (136.1 kg)       Exam   General:  Alert, no distress   Head:  NC/AT   Eyes:  Sclera anicteric, No conjunctival pallor,     Nose: Nares normal. Mucosa normal.    Throat: Lips normal. Teeth and gums normal.   Neck: Supple   Lungs:   Clear to auscultation bilaterally. Normal effort   Chest wall:  No tenderness or deformity.   Heart:  Regular rate and rhythm, S1, S2 normal   Abdomen:   Soft, non-tender. Bowel sounds normal. Non distended   Extremities: Right lower with dressing, redness has improved, left lower ext with prosthetic    Skin: Skin color, texture, turgor normal. No rashes or lesions.    Neurologic: Normal strength, no focal deficit appreciated         Data Review:       Labs:     Recent Labs   Lab 07/10/24  1954 07/11/24  0319 07/12/24  0550 07/13/24  0507   RBC 5.61 5.35 5.37 5.07   HGB 16.3 15.4 15.4 14.4   HCT 48.1 46.0 47.9 45.6   MCV 85.7 86.0 89.2 89.9   MCH 29.1 28.8 28.7 28.4   MCHC 33.9 33.5 32.2 31.6   RDW 14.6 14.6 14.8 14.9   NEPRELIM 9.25*  --   --   --    WBC 13.5* 12.1* 10.9 11.7*   .0* 605.0* 624.0* 609.0*         Recent Labs   Lab 07/11/24  0319 07/12/24  0550 07/13/24  0507   * 221* 185*   BUN 27* 29* 25*   CREATSERUM 1.54* 1.74* 1.57*   EGFRCR 47* 40* 46*   CA 9.1 9.5 9.0    138 139   K 4.5 4.8 4.5    106 107   CO2 27.0 26.0 26.0       No results for input(s): \"ALT\", \"AST\", \"ALB\", \"AMYLASE\", \"LIPASE\", \"LDH\" in the last 168 hours.    Invalid input(s): \"ALPHOS\", \"TBIL\", \"DBIL\", \"TPROT\"      Imaging:  No  results found.      Meds:      heparin  5,000 Units Subcutaneous Q8H JAYDEN    vancomycin  1,500 mg Intravenous Q24H    piperacillin-tazobactam  3.375 g Intravenous Q8H    amLODIPine  5 mg Oral Daily    buPROPion ER  150 mg Oral Daily    gabapentin  100 mg Oral TID    pantoprazole  40 mg Oral QAM AC    insulin degludec  40 Units Subcutaneous Nightly    clopidogrel  75 mg Oral Daily    gemfibrozil  600 mg Oral Daily    rosuvastatin  20 mg Oral Nightly    insulin aspart  1-5 Units Subcutaneous TID CC    insulin aspart  20 Units Subcutaneous TID CC         acetaminophen    melatonin    polyethylene glycol (PEG 3350)    sennosides    bisacodyl    ondansetron    acetaminophen    glucose **OR** glucose **OR** glucose-vitamin C **OR** dextrose **OR** glucose **OR** glucose **OR** glucose-vitamin C

## 2024-07-13 NOTE — PROGRESS NOTES
[facility]  part of [facility] Infectious Disease  Progress Note    Connor Mcgarry Patient Status:  Inpatient    1950 MRN [id number]   Location [facility] 5SW/SE Attending Jonathan Amado MD   Hosp Day # 3 PCP Brittny Patino,      Subjective:  Patient seen/examined.  Admitted with RLE cellulitis.  Feeling better overall.  Cultures have isolated MRSA.  No F/C.  No new issues.    Objective:  Blood pressure 126/67, pulse 86, temperature 97.4 °F (36.3 °C), temperature source Oral, resp. rate 18, height 5' 6\" (1.676 m), weight 287 lb 8 oz (130.4 kg), SpO2 93%.    Intake/Output:    Intake/Output Summary (Last 24 hours) at 2024 09  Last data filed at 2024 0800  Gross per 24 hour   Intake 840 ml   Output --   Net 840 ml       Physical Exam:  General: Awake, alert, non-tox, NAD.  HEENT:  Oropharynx clear, trachea ML.  Heart: RRR S1S2 no murmurs.  Lungs: Essentially CTA b/l, no rhonchi, rales, wheezes.  Abdomen: Soft, NT/ND.  BS present.  No organomegaly.  Extremity: L BKA.  RLE with macerated skin, excoriations stable as below.  Neurological: No focal deficits.  Derm:  Warm, dry, free from rashes.      Lab Data Review:  Lab Results   Component Value Date    WBC 11.7 2024    HGB 14.4 2024    HCT 45.6 2024    .0 2024    CREATSERUM 1.57 2024    BUN 25 2024     2024    K 4.5 2024     2024    CO2 26.0 2024     2024    CA 9.0 2024      Cultures:  7/10/24 blood cultures negative  7/10/24 wound cultures MRSA    Antibiotics Reviewed:  Vancomycin  Zosyn    Assessment and Plan:    Complicated RLE cellulitis with desquamation, blisters, and worsening drainage PTA  - Blood cultures negative  - Wound cultures with MRSA  - Local wound care ongoing  - IV vancomycin and zosyn ongoing    2.  Multifactorial leukocytosis due to the above  - At 11.7K today and ~stable    3.   DM II  - Patient needs continued optimal glycemic control    4.  Disposition - stable form ID.  Continue local wound care as Rx.  Discontinue IV zosyn given MRSA alone on cultures.  IV vancomycin ongoing with duration to be determined.  Trending temps and WBCs.  Will follow.    Jessika Farris DO, [facility] Infectious Disease  [phone]    7/13/2024  9:23 AM

## 2024-07-14 LAB
ANION GAP SERPL CALC-SCNC: 8 MMOL/L (ref 0–18)
BUN BLD-MCNC: 27 MG/DL (ref 9–23)
BUN/CREAT SERPL: 17.5 (ref 10–20)
CALCIUM BLD-MCNC: 9.2 MG/DL (ref 8.7–10.4)
CHLORIDE SERPL-SCNC: 106 MMOL/L (ref 98–112)
CO2 SERPL-SCNC: 26 MMOL/L (ref 21–32)
CREAT BLD-MCNC: 1.54 MG/DL
DEPRECATED RDW RBC AUTO: 49.5 FL (ref 35.1–46.3)
EGFRCR SERPLBLD CKD-EPI 2021: 47 ML/MIN/1.73M2 (ref 60–?)
ERYTHROCYTE [DISTWIDTH] IN BLOOD BY AUTOMATED COUNT: 14.7 % (ref 11–15)
GLUCOSE BLD-MCNC: 180 MG/DL (ref 70–99)
GLUCOSE BLDC GLUCOMTR-MCNC: 164 MG/DL (ref 70–99)
GLUCOSE BLDC GLUCOMTR-MCNC: 176 MG/DL (ref 70–99)
GLUCOSE BLDC GLUCOMTR-MCNC: 246 MG/DL (ref 70–99)
GLUCOSE BLDC GLUCOMTR-MCNC: 252 MG/DL (ref 70–99)
HCT VFR BLD AUTO: 47.4 %
HGB BLD-MCNC: 14.6 G/DL
MCH RBC QN AUTO: 28.3 PG (ref 26–34)
MCHC RBC AUTO-ENTMCNC: 30.8 G/DL (ref 31–37)
MCV RBC AUTO: 92 FL
OSMOLALITY SERPL CALC.SUM OF ELEC: 300 MOSM/KG (ref 275–295)
PLATELET # BLD AUTO: 594 10(3)UL (ref 150–450)
POTASSIUM SERPL-SCNC: 4.5 MMOL/L (ref 3.5–5.1)
RBC # BLD AUTO: 5.15 X10(6)UL
SODIUM SERPL-SCNC: 140 MMOL/L (ref 136–145)
VANCOMYCIN TROUGH SERPL-MCNC: 9.3 UG/ML (ref 10–20)
WBC # BLD AUTO: 11.6 X10(3) UL (ref 4–11)

## 2024-07-14 PROCEDURE — 80048 BASIC METABOLIC PNL TOTAL CA: CPT | Performed by: INTERNAL MEDICINE

## 2024-07-14 PROCEDURE — 80202 ASSAY OF VANCOMYCIN: CPT | Performed by: INTERNAL MEDICINE

## 2024-07-14 PROCEDURE — 82962 GLUCOSE BLOOD TEST: CPT

## 2024-07-14 PROCEDURE — 85027 COMPLETE CBC AUTOMATED: CPT | Performed by: INTERNAL MEDICINE

## 2024-07-14 RX ORDER — VANCOMYCIN 2 GRAM/500 ML IN 0.9 % SODIUM CHLORIDE INTRAVENOUS
2000 EVERY 24 HOURS
Status: DISCONTINUED | OUTPATIENT
Start: 2024-07-14 | End: 2024-07-17

## 2024-07-14 RX ORDER — HEPARIN SODIUM 5000 [USP'U]/ML
7500 INJECTION, SOLUTION INTRAVENOUS; SUBCUTANEOUS ONCE
Status: COMPLETED | OUTPATIENT
Start: 2024-07-14 | End: 2024-07-14

## 2024-07-14 RX ORDER — HEPARIN SODIUM 5000 [USP'U]/ML
7500 INJECTION, SOLUTION INTRAVENOUS; SUBCUTANEOUS EVERY 8 HOURS SCHEDULED
Status: DISCONTINUED | OUTPATIENT
Start: 2024-07-14 | End: 2024-07-17

## 2024-07-14 NOTE — PROGRESS NOTES
[facility]  part of [facility] Infectious Disease  Progress Note    Connor Mcgarry Patient Status:  Inpatient    1950 MRN [id number]   Location [facility] 5SW/SE Attending Jonathan Amado MD   Hosp Day # 4 PCP Brittny Patino,      Subjective:  Patient seen/examined.  Up in bed in NAD.  Continues to show improvement.  No F/C.  Compression dressings intact.    Objective:  Blood pressure 123/60, pulse 85, temperature 98.5 °F (36.9 °C), temperature source Oral, resp. rate 20, height 5' 6\" (1.676 m), weight 287 lb 8 oz (130.4 kg), SpO2 93%.    Intake/Output:    Intake/Output Summary (Last 24 hours) at 2024 0858  Last data filed at 2024 2105  Gross per 24 hour   Intake 370 ml   Output --   Net 370 ml       Physical Exam:  General: Awake, alert, non-tox, NAD.  HEENT:  Oropharynx clear, trachea ML.  Heart: RRR S1S2 no murmurs.  Lungs: Essentially CTA b/l, no rhonchi, rales, wheezes.  Abdomen: Soft, NT/ND.  BS present.  No organomegaly.  Extremity: RLE compression dressing intact with continued RLE erythema.  Neurological: No focal deficits.  Derm:  Warm, dry, free from rashes.    Lab Data Review:  Lab Results   Component Value Date    WBC 11.6 2024    HGB 14.6 2024    HCT 47.4 2024    .0 2024    CREATSERUM 1.54 2024    BUN 27 2024     2024    K 4.5 2024     2024    CO2 26.0 2024     2024    CA 9.2 2024      Cultures:  7/10/24 blood cultures negative  7/10/24 wound cultures MRSA     Antibiotics Reviewed:  Vancomycin     Assessment and Plan:     Complicated RLE cellulitis with desquamation, blisters, and worsening drainage PTA  - Blood cultures negative  - Wound cultures with MRSA  - Local wound care ongoing  - IV vancomycin ongoing     2.  Multifactorial leukocytosis due to the above  - At 11.6K today and ~stable     3.  DM II  - Patient needs continued  optimal glycemic control     4.  Disposition - stable form ID.  Continue local wound care as Rx.  Continue IV vancomycin as Rx.  Given slow but favorable response to therapy, we will plan for PICC in a.m. and discharge on 2 more weeks of IV Rx pending clinical course.  PICC order placed and will need social work to assist for discharge IV Rx options.  Patient agrees to this plan.    Jessika Farris DO, [facility] Infectious Disease  [phone]    7/14/2024  8:58 AM

## 2024-07-14 NOTE — PROGRESS NOTES
[facility] Hospitalist Progress Note     CC: Hospital Follow up    PCP: Brittny Patino DO       Assessment/Plan:     Principal Problem:    Cellulitis of right lower extremity    Mr. Mcgarry is a 74 year old male with PMH sig for type 2 DM, hs of left BKA, carotid artery stenosis, MDD, mobid obesity, CKD stage 3, who presents with recurrent right lower ext cellulitis with MRSA.      Right lower extremity cellulitis  Leucocytosis   MRSA  - erythema noted, not improving with doxy, WBC 13 on admit  - IV Vancomycin and zosyn started  - will continue with Vanco   - BC pending and wound cultures MRSA   - ID and wound care consulted  - elevated as tolerated      Type 2 DM  - A1c 8.3  - on novalog 20 TID, and treciba 40 units  - SSI and accuchecks     Mild KARINA on CKD stage 3  - cre 1.5 on admit, at baseline range   - oral hydration   - monitor      HTN  - amlodipine 5 mg     Carotid artery disease  PAD  - see vascular Dr. Zepeda  - continue plavix     Hs of left BKA  - prosthetic in place     Depression   - continue home meds     Morbid obesity  - weight loss endevours as OP     FN:  - IVF: none  - Diet: diabetic diet     DVT Prophy: scd, heparin  Lines: PIV     Dispo: pending clinical course     Outpatient records or previous hospital records reviewed.      Further recommendations pending patient's clinical course.  [facility] hospitalist to continue to follow patient while in house     Patient and/or patient's family given opportunity to ask questions and note understanding and agreeing with therapeutic plan as outlined     Thank You,  Jonathan Amado MD     Hospitalist with [facility] [place]  Answering Service number: [phone]     Subjective:     No CP, SOB, or N/V.  Not much pain in the left leg.   Redness has improved.     OBJECTIVE:    Blood pressure 123/60, pulse 85, temperature 98.5 °F (36.9 °C), temperature source Oral, resp. rate 20, height 5' 6\" (1.676 m), weight 287 lb 8 oz (130.4 kg), SpO2 93%.    Temp:  [97.5 °F (36.4  °C)-98.5 °F (36.9 °C)] 98.5 °F (36.9 °C)  Pulse:  [81-85] 85  Resp:  [20] 20  BP: (123-140)/(60-68) 123/60  SpO2:  [92 %-93 %] 93 %      Intake/Output:    Intake/Output Summary (Last 24 hours) at 7/14/2024 1049  Last data filed at 7/13/2024 2105  Gross per 24 hour   Intake 370 ml   Output --   Net 370 ml       Last 3 Weights   07/10/24 2121 287 lb 8 oz (130.4 kg)   07/10/24 1954 293 lb 3.4 oz (133 kg)   01/14/22 1054 277 lb 11.2 oz (126 kg)   10/12/21 0856 300 lb (136.1 kg)       Exam   General:  Alert, no distress   Head:  NC/AT   Eyes:  Sclera anicteric, No conjunctival pallor,     Nose: Nares normal. Mucosa normal.    Throat: Lips normal. Teeth and gums normal.   Neck: Supple   Lungs:   Clear to auscultation bilaterally. Normal effort   Chest wall:  No tenderness or deformity.   Heart:  Regular rate and rhythm, S1, S2 normal   Abdomen:   Soft, non-tender. Bowel sounds normal. Non distended   Extremities: Right lower with dressing, redness has improved, left lower ext with prosthetic    Skin: Skin color, texture, turgor normal. No rashes or lesions.    Neurologic: Normal strength, no focal deficit appreciated         Data Review:       Labs:     Recent Labs   Lab 07/10/24  1954 07/11/24  0319 07/12/24  0550 07/13/24  0507 07/14/24  0556   RBC 5.61   < > 5.37 5.07 5.15   HGB 16.3   < > 15.4 14.4 14.6   HCT 48.1   < > 47.9 45.6 47.4   MCV 85.7   < > 89.2 89.9 92.0   MCH 29.1   < > 28.7 28.4 28.3   MCHC 33.9   < > 32.2 31.6 30.8*   RDW 14.6   < > 14.8 14.9 14.7   NEPRELIM 9.25*  --   --   --   --    WBC 13.5*   < > 10.9 11.7* 11.6*   .0*   < > 624.0* 609.0* 594.0*    < > = values in this interval not displayed.         Recent Labs   Lab 07/12/24  0550 07/13/24  0507 07/14/24  0556   * 185* 180*   BUN 29* 25* 27*   CREATSERUM 1.74* 1.57* 1.54*   EGFRCR 40* 46* 47*   CA 9.5 9.0 9.2    139 140   K 4.8 4.5 4.5    107 106   CO2 26.0 26.0 26.0       No results for input(s): \"ALT\", \"AST\", \"ALB\",  \"AMYLASE\", \"LIPASE\", \"LDH\" in the last 168 hours.    Invalid input(s): \"ALPHOS\", \"TBIL\", \"DBIL\", \"TPROT\"      Imaging:  No results found.      Meds:      vancomycin  1,500 mg Intravenous Q24H    heparin  5,000 Units Subcutaneous Q8H JAYDEN    amLODIPine  5 mg Oral Daily    buPROPion ER  150 mg Oral Daily    gabapentin  100 mg Oral TID    pantoprazole  40 mg Oral QAM AC    insulin degludec  40 Units Subcutaneous Nightly    clopidogrel  75 mg Oral Daily    gemfibrozil  600 mg Oral Daily    rosuvastatin  20 mg Oral Nightly    insulin aspart  1-5 Units Subcutaneous TID CC    insulin aspart  20 Units Subcutaneous TID CC         acetaminophen    melatonin    polyethylene glycol (PEG 3350)    sennosides    bisacodyl    ondansetron    acetaminophen    glucose **OR** glucose **OR** glucose-vitamin C **OR** dextrose **OR** glucose **OR** glucose **OR** glucose-vitamin C

## 2024-07-14 NOTE — PLAN OF CARE
Problem: Patient Centered Care  Goal: Patient preferences are identified and integrated in the patient's plan of care  Description: Interventions:  - What would you like us to know as we care for you? Home with daughter  - Provide timely, complete, and accurate information to patient/family  - Incorporate patient and family knowledge, values, beliefs, and cultural backgrounds into the planning and delivery of care  - Encourage patient/family to participate in care and decision-making at the level they choose  - Honor patient and family perspectives and choices  Outcome: Progressing     Problem: Diabetes/Glucose Control  Goal: Glucose maintained within prescribed range  Description: INTERVENTIONS:  - Monitor Blood Glucose as ordered  - Assess for signs and symptoms of hyperglycemia and hypoglycemia  - Administer ordered medications to maintain glucose within target range  - Assess barriers to adequate nutritional intake and initiate nutrition consult as needed  - Instruct patient on self management of diabetes  Outcome: Progressing     Problem: PAIN - ADULT  Goal: Verbalizes/displays adequate comfort level or patient's stated pain goal  Description: INTERVENTIONS:  - Encourage pt to monitor pain and request assistance  - Assess pain using appropriate pain scale  - Administer analgesics based on type and severity of pain and evaluate response  - Implement non-pharmacological measures as appropriate and evaluate response  - Consider cultural and social influences on pain and pain management  - Manage/alleviate anxiety  - Utilize distraction and/or relaxation techniques  - Monitor for opioid side effects  - Notify MD/LIP if interventions unsuccessful or patient reports new pain  - Anticipate increased pain with activity and pre-medicate as appropriate  Outcome: Progressing     Problem: RISK FOR INFECTION - ADULT  Goal: Absence of fever/infection during anticipated neutropenic period  Description: INTERVENTIONS  -  Monitor WBC  - Administer growth factors as ordered  - Implement neutropenic guidelines  Outcome: Progressing     Problem: DISCHARGE PLANNING  Goal: Discharge to home or other facility with appropriate resources  Description: INTERVENTIONS:  - Identify barriers to discharge w/pt and caregiver  - Include patient/family/discharge partner in discharge planning  - Arrange for needed discharge resources and transportation as appropriate  - Identify discharge learning needs (meds, wound care, etc)  - Arrange for interpreters to assist at discharge as needed  - Consider post-discharge preferences of patient/family/discharge partner  - Complete POLST form as appropriate  - Assess patient's ability to be responsible for managing their own health  - Refer to Case Management Department for coordinating discharge planning if the patient needs post-hospital services based on physician/LIP order or complex needs related to functional status, cognitive ability or social support system  Outcome: Progressing     Problem: METABOLIC/FLUID AND ELECTROLYTES - ADULT  Goal: Glucose maintained within prescribed range  Description: INTERVENTIONS:  - Monitor Blood Glucose as ordered  - Assess for signs and symptoms of hyperglycemia and hypoglycemia  - Administer ordered medications to maintain glucose within target range  - Assess barriers to adequate nutritional intake and initiate nutrition consult as needed  - Instruct patient on self management of diabetes  Outcome: Progressing  Goal: Electrolytes maintained within normal limits  Description: INTERVENTIONS:  - Monitor labs and rhythm and assess patient for signs and symptoms of electrolyte imbalances  - Administer electrolyte replacement as ordered  - Monitor response to electrolyte replacements, including rhythm and repeat lab results as appropriate  - Fluid restriction as ordered  - Instruct patient on fluid and nutrition restrictions as appropriate  Outcome: Progressing  Goal:  Hemodynamic stability and optimal renal function maintained  Description: INTERVENTIONS:  - Monitor labs and assess for signs and symptoms of volume excess or deficit  - Monitor intake, output and patient weight  - Monitor urine specific gravity, serum osmolarity and serum sodium as indicated or ordered  - Monitor response to interventions for patient's volume status, including labs, urine output, blood pressure (other measures as available)  - Encourage oral intake as appropriate  - Instruct patient on fluid and nutrition restrictions as appropriate  Outcome: Progressing     Problem: SKIN/TISSUE INTEGRITY - ADULT  Goal: Skin integrity remains intact  Description: INTERVENTIONS  - Assess and document risk factors for pressure ulcer development  - Assess and document skin integrity  - Monitor for areas of redness and/or skin breakdown  - Initiate interventions, skin care algorithm/standards of care as needed  Outcome: Progressing  Goal: Incision(s), wounds(s) or drain site(s) healing without S/S of infection  Description: INTERVENTIONS:  - Assess and document risk factors for pressure ulcer development  - Assess and document skin integrity  - Assess and document dressing/incision, wound bed, drain sites and surrounding tissue  - Implement wound care per orders  - Initiate isolation precautions as appropriate  - Initiate Pressure Ulcer prevention bundle as indicated  Outcome: Progressing     Problem: HEMATOLOGIC - ADULT  Goal: Maintains hematologic stability  Description: INTERVENTIONS  - Assess for signs and symptoms of bleeding or hemorrhage  - Monitor labs and vital signs for trends  - Administer supportive blood products/factors, fluids and medications as ordered and appropriate  - Administer supportive blood products/factors as ordered and appropriate  Outcome: Progressing     Patient is presently sitting up in chair. Alert x 4. Vital signs taken and stable. Fall risk precautions are followed at times. No  complaints of pain or discomfort. Tolerates diet well. Left BKA. Right lower extremity will be change per order. Contact isolation precautions are followed at all times. Call light within reach at all times. Patient receives intravenous antibiotics per order.

## 2024-07-15 ENCOUNTER — APPOINTMENT (OUTPATIENT)
Dept: PICC SERVICES | Facility: HOSPITAL | Age: 74
End: 2024-07-15
Attending: INTERNAL MEDICINE
Payer: MEDICARE

## 2024-07-15 LAB
ANION GAP SERPL CALC-SCNC: 8 MMOL/L (ref 0–18)
BUN BLD-MCNC: 29 MG/DL (ref 9–23)
BUN/CREAT SERPL: 18.7 (ref 10–20)
CALCIUM BLD-MCNC: 9.7 MG/DL (ref 8.7–10.4)
CHLORIDE SERPL-SCNC: 105 MMOL/L (ref 98–112)
CO2 SERPL-SCNC: 25 MMOL/L (ref 21–32)
CREAT BLD-MCNC: 1.55 MG/DL
DEPRECATED RDW RBC AUTO: 47.3 FL (ref 35.1–46.3)
EGFRCR SERPLBLD CKD-EPI 2021: 46 ML/MIN/1.73M2 (ref 60–?)
ERYTHROCYTE [DISTWIDTH] IN BLOOD BY AUTOMATED COUNT: 14.6 % (ref 11–15)
GLUCOSE BLD-MCNC: 217 MG/DL (ref 70–99)
GLUCOSE BLDC GLUCOMTR-MCNC: 115 MG/DL (ref 70–99)
GLUCOSE BLDC GLUCOMTR-MCNC: 157 MG/DL (ref 70–99)
GLUCOSE BLDC GLUCOMTR-MCNC: 204 MG/DL (ref 70–99)
GLUCOSE BLDC GLUCOMTR-MCNC: 258 MG/DL (ref 70–99)
HCT VFR BLD AUTO: 48 %
HGB BLD-MCNC: 15.3 G/DL
MCH RBC QN AUTO: 28.5 PG (ref 26–34)
MCHC RBC AUTO-ENTMCNC: 31.9 G/DL (ref 31–37)
MCV RBC AUTO: 89.6 FL
OSMOLALITY SERPL CALC.SUM OF ELEC: 298 MOSM/KG (ref 275–295)
PLATELET # BLD AUTO: 598 10(3)UL (ref 150–450)
POTASSIUM SERPL-SCNC: 4.6 MMOL/L (ref 3.5–5.1)
RBC # BLD AUTO: 5.36 X10(6)UL
SODIUM SERPL-SCNC: 138 MMOL/L (ref 136–145)
WBC # BLD AUTO: 11.3 X10(3) UL (ref 4–11)

## 2024-07-15 PROCEDURE — 80048 BASIC METABOLIC PNL TOTAL CA: CPT | Performed by: INTERNAL MEDICINE

## 2024-07-15 PROCEDURE — 82962 GLUCOSE BLOOD TEST: CPT

## 2024-07-15 PROCEDURE — 02HV33Z INSERTION OF INFUSION DEVICE INTO SUPERIOR VENA CAVA, PERCUTANEOUS APPROACH: ICD-10-PCS | Performed by: INTERNAL MEDICINE

## 2024-07-15 PROCEDURE — 36569 INSJ PICC 5 YR+ W/O IMAGING: CPT

## 2024-07-15 PROCEDURE — 85027 COMPLETE CBC AUTOMATED: CPT | Performed by: INTERNAL MEDICINE

## 2024-07-15 RX ORDER — LIDOCAINE HYDROCHLORIDE 10 MG/ML
5 INJECTION, SOLUTION EPIDURAL; INFILTRATION; INTRACAUDAL; PERINEURAL
Status: COMPLETED | OUTPATIENT
Start: 2024-07-15 | End: 2024-07-15

## 2024-07-15 RX ORDER — VANCOMYCIN/0.9 % SOD CHLORIDE 1 G/100 ML
2 PLASTIC BAG, INJECTION (ML) INTRAVENOUS EVERY 24 HOURS
Qty: 28 EACH | Refills: 0 | Status: SHIPPED | OUTPATIENT
Start: 2024-07-15 | End: 2024-07-16

## 2024-07-15 RX ORDER — VANCOMYCIN 2 GRAM/500 ML IN 0.9 % SODIUM CHLORIDE INTRAVENOUS
2000 EVERY 24 HOURS
OUTPATIENT
Start: 2024-07-17

## 2024-07-15 NOTE — PAYOR COMM NOTE
RECONSIDERATION REQUEST     **As a reminder, Medicare Advantage plans are instructed to provide, at a minimum, the same coverage that a traditional Medicare beneficiary would receive. Beyond any doubt, a traditional Medicare recipient in this same situation would have received Medicare coverage for these inpatient medically necessary services. **      --------------  CONTINUED STAY REVIEW    Payor: [employer] O  Subscriber #:  [id number]  Authorization Number: [id number]    Admit date: 7/10/24  Admit time:  9:18 PM    REVIEW DOCUMENTATION:      7/13    Principal Problem:    Cellulitis of right lower extremity     Mr. Mcgarry is a 74 year old male with PMH sig for type 2 DM, hs of left BKA, carotid artery stenosis, MDD, mobid obesity, CKD stage 3, who presents with recurrent right lower ext cellulitis with MRSA.      Right lower extremity cellulitis  Leucocytosis   MRSA  - erythema noted, not improving with doxy, WBC 13 on admit  - IV Vancomycin and zosyn started  - will continue with Vanco   - BC pending and wound cultures MRSA   - ID and wound care consulted  - elevated as tolerted     Type 2 DM  - A1c 8.3  - on novalog 20 TID, and treciba 40 units  - SSI and accuchecks     Mild KARINA on CKD stage 3  - cre 1.5 on admit, at baseline range   - oral hydration   - monitor      HTN  - amlodipine 5 mg     Carotid artery disease  PAD  - see vascular Dr. Zepeda  - continue plavix     Hs of left BKA  - prosthetic in place     Depression   - continue home meds     Morbid obesity  - weight loss endevours as OP     FN:  - IVF: none  - Diet: diabetic diet     DVT Prophy: scd, heparin  Lines: PIV     Dispo: pending clinical course     Outpatient records or previous hospital records reviewed.      Further recommendations pending patient's clinical course.  [facility] hospitalist to continue to follow patient while in house     Patient and/or patient's family given opportunity to ask questions and note understanding and agreeing with  therapeutic plan as outlined     Thank You,  Jonathan Amado MD     Hospitalist with [facility]  Answering Service number: [phone]      Subjective:      No CP, SOB, or N/V.  Not much pain in the left leg.   Redness has improved.      OBJECTIVE:     Blood pressure 126/67, pulse 86, temperature 97.4 °F (36.3 °C), temperature source Oral, resp. rate 18, height 5' 6\" (1.676 m), weight 287 lb 8 oz (130.4 kg), SpO2 93%.     Temp:  [97.4 °F (36.3 °C)-98 °F (36.7 °C)] 97.4 °F (36.3 °C)  Pulse:  [82-86] 86  Resp:  [16-19] 18  BP: (126-150)/(62-77) 126/67  SpO2:  [92 %-95 %] 93 %      Lab 07/10/24  1954 07/11/24  0319 07/12/24  0550 07/13/24  0507   RBC 5.61 5.35 5.37 5.07   HGB 16.3 15.4 15.4 14.4   HCT 48.1 46.0 47.9 45.6   MCV 85.7 86.0 89.2 89.9   MCH 29.1 28.8 28.7 28.4   MCHC 33.9 33.5 32.2 31.6   RDW 14.6 14.6 14.8 14.9   NEPRELIM 9.25*  --   --   --    WBC 13.5* 12.1* 10.9 11.7*   .0* 605.0* 624.0* 609.0*                  Recent Labs   Lab 07/11/24 0319 07/12/24  0550 07/13/24  0507   * 221* 185*   BUN 27* 29* 25*   CREATSERUM 1.54* 1.74* 1.57*   EGFRCR 47* 40* 46*   CA 9.1 9.5 9.0    138 139   K 4.5 4.8 4.5    106 107   CO2 27.0 26.0 26.0       Scheduled Medications    heparin  5,000 Units Subcutaneous Q8H JAYDEN    vancomycin  1,500 mg Intravenous Q24H    piperacillin-tazobactam  3.375 g Intravenous Q8H    amLODIPine  5 mg Oral Daily    buPROPion ER  150 mg Oral Daily    gabapentin  100 mg Oral TID    pantoprazole  40 mg Oral QAM AC    insulin degludec  40 Units Subcutaneous Nightly    clopidogrel  75 mg Oral Daily    gemfibrozil  600 mg Oral Daily    rosuvastatin  20 mg Oral Nightly    insulin aspart  1-5 Units Subcutaneous TID CC    insulin aspart  20 Units Subcutaneous TID CC                 7/14 IM    Principal Problem:    Cellulitis of right lower extremity     Mr. Mcgarry is a 74 year old male with PMH sig for type 2 DM, hs of left BKA, carotid artery stenosis, MDD, mobid  obesity, CKD stage 3, who presents with recurrent right lower ext cellulitis with MRSA.      Right lower extremity cellulitis  Leucocytosis   MRSA  - erythema noted, not improving with doxy, WBC 13 on admit  - IV Vancomycin and zosyn started  - will continue with Vanco   - BC pending and wound cultures MRSA   - ID and wound care consulted  - elevated as tolerated      Type 2 DM  - A1c 8.3  - on novalog 20 TID, and treciba 40 units  - SSI and accuchecks     Mild KARINA on CKD stage 3  - cre 1.5 on admit, at baseline range   - oral hydration   - monitor      HTN  - amlodipine 5 mg     Carotid artery disease  PAD  - see vascular Dr. Zepeda  - continue plavix     Hs of left BKA  - prosthetic in place     Depression   - continue home meds     Morbid obesity  - weight loss endevours as OP     FN:  - IVF: none  - Diet: diabetic diet     DVT Prophy: scd, heparin  Lines: PIV     Dispo: pending clinical course      No CP, SOB, or N/V.  Not much pain in the left leg.   Redness has improved.      OBJECTIVE:     Blood pressure 123/60, pulse 85, temperature 98.5 °F (36.9 °C), temperature source Oral, resp. rate 20, height 5' 6\" (1.676 m), weight 287 lb 8 oz (130.4 kg), SpO2 93%.     Temp:  [97.5 °F (36.4 °C)-98.5 °F (36.9 °C)] 98.5 °F (36.9 °C)  Pulse:  [81-85] 85  Resp:  [20] 20  BP: (123-140)/(60-68) 123/60  SpO2:  [92 %-93 %] 93 %      Lab 07/10/24  1954 07/11/24  0319 07/12/24  0550 07/13/24  0507 07/14/24  0556   RBC 5.61   < > 5.37 5.07 5.15   HGB 16.3   < > 15.4 14.4 14.6   HCT 48.1   < > 47.9 45.6 47.4   MCV 85.7   < > 89.2 89.9 92.0   MCH 29.1   < > 28.7 28.4 28.3   MCHC 33.9   < > 32.2 31.6 30.8*   RDW 14.6   < > 14.8 14.9 14.7   NEPRELIM 9.25*  --   --   --   --    WBC 13.5*   < > 10.9 11.7* 11.6*   .0*   < > 624.0* 609.0* 594.0*    < > = values in this interval not displayed.                  Recent Labs   Lab 07/12/24  0550 07/13/24  0507 07/14/24  0556   * 185* 180*   BUN 29* 25* 27*   CREATSERUM 1.74*  1.57* 1.54*   EGFRCR 40* 46* 47*   CA 9.5 9.0 9.2    139 140   K 4.8 4.5 4.5    107 106   CO2 26.0 26.0 26.0       Scheduled Medications    vancomycin  1,500 mg Intravenous Q24H              7/15        Principal Problem:    Cellulitis of right lower extremity     Mr. Mcgarry is a 74 year old male with PMH sig for type 2 DM, hs of left BKA, carotid artery stenosis, MDD, mobid obesity, CKD stage 3, who presents with recurrent right lower ext cellulitis with MRSA. PICC line with plans for 2 weeks iv vanco.      Right lower extremity cellulitis  Leucocytosis   MRSA  - erythema noted, not improving with doxy, WBC 13 on admit  - IV Vancomycin and zosyn started  - will continue with Vanco   - BC pending and wound cultures MRSA   - ID and wound care consulted  - elevated as tolerated   - PICC line with plans for 2 weeks iv vanco     Type 2 DM  - A1c 8.3  - on novalog 20 TID, and treciba 40 units  - SSI and accuchecks     Mild KARINA on CKD stage 3  - cre 1.5 on admit, at baseline range   - oral hydration   - monitor      HTN  - amlodipine 5 mg     Carotid artery disease  PAD  - see vascular Dr. Zepeda  - continue plavix     Hs of left BKA  - prosthetic in place     Depression   - continue home meds     Morbid obesity  - weight loss endevours as OP     FN:  - IVF: none  - Diet: diabetic diet     DVT Prophy: scd, heparin  Lines: PIV     Dispo: pending clinical course  Lives in independent living   Will need  for wound care and iv abx         Blood pressure 153/68, pulse 86, temperature 98.1 °F (36.7 °C), temperature source Oral, resp. rate 18, height 5' 6\" (1.676 m), weight 287 lb 8 oz (130.4 kg), SpO2 92%.     Temp:  [97.9 °F (36.6 °C)-98.3 °F (36.8 °C)] 98.1 °F (36.7 °C)  Pulse:  [82-91] 86  Resp:  [18-20] 18  BP: (117-153)/(61-75) 153/68  SpO2:  [91 %-92 %] 92 %      Lab 07/10/24  1954 07/11/24  0319 07/13/24  0507 07/14/24  0556 07/15/24  0655   RBC 5.61   < > 5.07 5.15 5.36   HGB 16.3   < > 14.4 14.6 15.3   HCT  48.1   < > 45.6 47.4 48.0   MCV 85.7   < > 89.9 92.0 89.6   MCH 29.1   < > 28.4 28.3 28.5   MCHC 33.9   < > 31.6 30.8* 31.9   RDW 14.6   < > 14.9 14.7 14.6   NEPRELIM 9.25*  --   --   --   --    WBC 13.5*   < > 11.7* 11.6* 11.3*   .0*   < > 609.0* 594.0* 598.0*    < > = values in this interval not displayed.                  Recent Labs   Lab 07/13/24  0507 07/14/24  0556 07/15/24  0655   * 180* 217*   BUN 25* 27* 29*   CREATSERUM 1.57* 1.54* 1.55*   EGFRCR 46* 47* 46*   CA 9.0 9.2 9.7    140 138   K 4.5 4.5 4.6    106 105   CO2 26.0 26.0 25.0          vancomycin  2,000 mg Intravenous Q24H             MEDICATIONS ADMINISTERED IN LAST 1 DAY:  amLODIPine (Norvasc) tab 5 mg       Date Action Dose Route User    7/15/2024 1024 Given 5 mg Oral Marichuy Sanchez RN          buPROPion ER (Wellbutrin XL) 24 hr tab 150 mg       Date Action Dose Route User    7/15/2024 1024 Given 150 mg Oral Marichuy Sanchez RN          clopidogrel (Plavix) tab 75 mg       Date Action Dose Route User    7/15/2024 1024 Given 75 mg Oral Marichuy Sanchez RN          gabapentin (Neurontin) cap 100 mg       Date Action Dose Route User    7/15/2024 1024 Given 100 mg Oral Marichuy Sanchez RN    7/14/2024 2109 Given 100 mg Oral Jhonathan Lange RN    7/14/2024 1541 Given 100 mg Oral Anisa Nichols RN          gemfibrozil (Lopid) tab 600 mg       Date Action Dose Route User    7/15/2024 1023 Given 600 mg Oral Marichuy Sanchez RN          heparin (Porcine) 5000 UNIT/ML injection 7,500 Units       Date Action Dose Route User    7/15/2024 1451 Given 7,500 Units Subcutaneous (Right Lower Abdomen) Marichuy Sanchez RN    7/15/2024 0657 Given 7,500 Units Subcutaneous (Right Upper Arm) Jhonathan Lange RN    7/14/2024 2109 Given 7,500 Units Subcutaneous (Left Lower Abdomen) Jhonathan Lange, RN          insulin aspart (NovoLOG) 100 Units/mL FlexPen 1-5 Units       Date Action Dose Route User    7/15/2024 0812 Given 3 Units  Subcutaneous (Right Lower Abdomen) Marichuy Sanchez RN    7/14/2024 1655 Given 3 Units Subcutaneous (Left Lower Abdomen) Anisa Nichols RN          insulin aspart (NovoLOG) 100 Units/mL FlexPen 20 Units       Date Action Dose Route User    7/15/2024 1226 Given 20 Units Subcutaneous (Right Lower Abdomen) Marichuy Sanchez RN    7/15/2024 0812 Given 20 Units Subcutaneous (Right Lower Abdomen) Marichuy Sanchez RN    7/14/2024 1654 Given 20 Units Subcutaneous (Left Lower Abdomen) Anisa Nichols RN          insulin degludec (Tresiba) 100 units/mL flextouch 40 Units       Date Action Dose Route User    7/14/2024 2111 Given 40 Units Subcutaneous (Left Lower Abdomen) Jhonathan Lange RN          lidocaine PF (Xylocaine-MPF) 1% injection       Date Action Dose Route User    7/15/2024 1003 Given 5 mL Intradermal Doreen Ghotra RN          pantoprazole (Protonix) DR tab 40 mg       Date Action Dose Route User    7/15/2024 0658 Given 40 mg Oral Jhonathan Lange RN          rosuvastatin (Crestor) tab 20 mg       Date Action Dose Route User    7/14/2024 2109 Given 20 mg Oral Jhonathan Lange RN          vancomycin (Vancocin) 2 g in sodium chloride 0.9% 500mL IVPB premix       Date Action Dose Route User    7/14/2024 2110 New Bag 2,000 mg Intravenous Jhonathan Lange RN            Vitals (last day)       Date/Time Temp Pulse Resp BP SpO2 Weight O2 Device O2 Flow Rate (L/min) [facility]    07/15/24 1448 97.5 °F (36.4 °C) 85 18 147/68 91 % -- None (Room air) --     07/15/24 1015 97.5 °F (36.4 °C) 85 18 124/90 90 % -- None (Room air) --     07/15/24 0659 98.1 °F (36.7 °C) 86 18 153/68 92 % -- None (Room air) --     07/14/24 2107 97.9 °F (36.6 °C) 82 20 117/75 92 % -- None (Room air) --     07/14/24 1504 98.3 °F (36.8 °C) 91 20 137/61 91 % -- None (Room air) -- LYDIA    07/14/24 0817 98.5 °F (36.9 °C) 85 20 123/60 93 % -- None (Room air) -- LYDIA    07/14/24 0438 98.2 °F (36.8 °C) 84 20 140/68 92 % -- None (Room air) -- RS           CIWA Scores (since admission)       None

## 2024-07-15 NOTE — DISCHARGE INSTRUCTIONS
Sometimes managing your health at home requires assistance.  The [facility] team has recognized your preference to use [facility], [place].  They can be reached at [phone].  The fax number for your reference is [phone].  A representative from the home health agency will contact you or your family to schedule your first visit.       Keep all follow up appointments and continue current medications.       1st Infusion Appointment:    [address]  [address]  [place] [address]    2:00 PM    All follow-up infusion appointments will be at:    [facility], [address]  [place] [address]    [address]      Per MD orders for dressings;   Cleanse wound with saline   ~Apply xeroform gauze to right medial, lateral and posterior leg wound beds,  apply aqua winsome extra alginate on top and abd pads, secure with Kerlix roll and tape   ~Change daily and prn.

## 2024-07-15 NOTE — CM/SW NOTE
07/15/24 1200   CM/SW Referral Data   Referral Source Physician;Social Work (self-referral)   Reason for Referral Discharge planning   Informant Patient;EMR;Clinical Staff Member   Medical Hx   Does patient have an established PCP? Yes  (Dr. Brittny Patino)   Patient Info   Advanced directives? No   Patient's Current Mental Status at Time of Assessment Alert;Oriented   Patient's Home Environment Assisted Living   Post Acute Care Provider Upon Admission [place] [facility]   Number of Levels in Home 1   Patient lives with Alone   Patient Status Prior to Admission   Independent with ADLs and Mobility Yes   Services in place prior to admission Home Health Care   Home Health Provider Info [facility]   Discharge Needs   Anticipated D/C needs Infusion care;Home health care   Choice of Post-Acute Provider   Informed patient of right to choose their preferred provider Yes     SW self-referred to this case for discharge planning.    Patient admitted for cellulitis of left lower extremity.  ID following case.  MRSA cultures +.    KURT met with patient bedside to complete above assessment.    KURT confirmed address and PCP on file.    The patient is from [facility].  He is self-care at baseline.    The patient stated he is active with [facility] for wound care RN.  KURT spoke to Josefina from [facility] who confirmed above.  SANDEEP entered.    The patient needs 14 days of IV Vanco at [place].  PICC placed.    The patient stated he would prefer coming to [facility].  The patient stated [email or web address] transport who is contracted with [facility] is aware and he has contacted [facility].    Therapy plan entered.    KURT spoke to Zoey from the Infusion Center who confirmed patient is a [facility] [employer] plan.  He need a prior auth prior to coming to [facility] for outpatient services.    [facility] who contacted Dr. Patino's office.  Fax provided to   Agent.  KURT to fax auth to Zoey at [phone].    PLAN:  DC to [facility] w/[facility] and outpatient IV abx at [facility] pending referral/prior auth    / to remain available for support and/or discharge planning.     Ruthann Sauceda MSW, LSW [id number]

## 2024-07-15 NOTE — PLAN OF CARE
Problem: Patient Centered Care  Goal: Patient preferences are identified and integrated in the patient's plan of care  Description: Interventions:  - What would you like us to know as we care for you? Home with daughter  - Provide timely, complete, and accurate information to patient/family  - Incorporate patient and family knowledge, values, beliefs, and cultural backgrounds into the planning and delivery of care  - Encourage patient/family to participate in care and decision-making at the level they choose  - Honor patient and family perspectives and choices  7/15/2024 0538 by Jhonathan Lange RN  Outcome: Progressing  7/15/2024 0537 by Jhonathan Lange, RN  Outcome: Progressing     Problem: Diabetes/Glucose Control  Goal: Glucose maintained within prescribed range  Description: INTERVENTIONS:  - Monitor Blood Glucose as ordered  - Assess for signs and symptoms of hyperglycemia and hypoglycemia  - Administer ordered medications to maintain glucose within target range  - Assess barriers to adequate nutritional intake and initiate nutrition consult as needed  - Instruct patient on self management of diabetes  7/15/2024 0538 by Jhonathan Lange RN  Outcome: Progressing  7/15/2024 0537 by Jhonathan Lange RN  Outcome: Progressing     Problem: PAIN - ADULT  Goal: Verbalizes/displays adequate comfort level or patient's stated pain goal  Description: INTERVENTIONS:  - Encourage pt to monitor pain and request assistance  - Assess pain using appropriate pain scale  - Administer analgesics based on type and severity of pain and evaluate response  - Implement non-pharmacological measures as appropriate and evaluate response  - Consider cultural and social influences on pain and pain management  - Manage/alleviate anxiety  - Utilize distraction and/or relaxation techniques  - Monitor for opioid side effects  - Notify MD/LIP if interventions unsuccessful or patient reports new pain  - Anticipate increased pain with  activity and pre-medicate as appropriate  7/15/2024 0538 by Jhonathan Lange RN  Outcome: Progressing  7/15/2024 0537 by Jhonathan Lange RN  Outcome: Progressing     Problem: RISK FOR INFECTION - ADULT  Goal: Absence of fever/infection during anticipated neutropenic period  Description: INTERVENTIONS  - Monitor WBC  - Administer growth factors as ordered  - Implement neutropenic guidelines  7/15/2024 0538 by Jhonathan Lange RN  Outcome: Progressing  7/15/2024 0537 by Jhonathan Lange RN  Outcome: Progressing     Problem: DISCHARGE PLANNING  Goal: Discharge to home or other facility with appropriate resources  Description: INTERVENTIONS:  - Identify barriers to discharge w/pt and caregiver  - Include patient/family/discharge partner in discharge planning  - Arrange for needed discharge resources and transportation as appropriate  - Identify discharge learning needs (meds, wound care, etc)  - Arrange for interpreters to assist at discharge as needed  - Consider post-discharge preferences of patient/family/discharge partner  - Complete POLST form as appropriate  - Assess patient's ability to be responsible for managing their own health  - Refer to Case Management Department for coordinating discharge planning if the patient needs post-hospital services based on physician/LIP order or complex needs related to functional status, cognitive ability or social support system  7/15/2024 0538 by Jhonathan Lange RN  Outcome: Progressing  7/15/2024 0537 by Jhonathan Lange RN  Outcome: Progressing     Problem: METABOLIC/FLUID AND ELECTROLYTES - ADULT  Goal: Glucose maintained within prescribed range  Description: INTERVENTIONS:  - Monitor Blood Glucose as ordered  - Assess for signs and symptoms of hyperglycemia and hypoglycemia  - Administer ordered medications to maintain glucose within target range  - Assess barriers to adequate nutritional intake and initiate nutrition consult as needed  - Instruct patient on self  management of diabetes  7/15/2024 0538 by Jhonathan Lange RN  Outcome: Progressing  7/15/2024 0537 by Jhonathan Lange RN  Outcome: Progressing  Goal: Electrolytes maintained within normal limits  Description: INTERVENTIONS:  - Monitor labs and rhythm and assess patient for signs and symptoms of electrolyte imbalances  - Administer electrolyte replacement as ordered  - Monitor response to electrolyte replacements, including rhythm and repeat lab results as appropriate  - Fluid restriction as ordered  - Instruct patient on fluid and nutrition restrictions as appropriate  7/15/2024 0538 by Jhonathan Lange RN  Outcome: Progressing  7/15/2024 0537 by Jhonathan Lange RN  Outcome: Progressing  Goal: Hemodynamic stability and optimal renal function maintained  Description: INTERVENTIONS:  - Monitor labs and assess for signs and symptoms of volume excess or deficit  - Monitor intake, output and patient weight  - Monitor urine specific gravity, serum osmolarity and serum sodium as indicated or ordered  - Monitor response to interventions for patient's volume status, including labs, urine output, blood pressure (other measures as available)  - Encourage oral intake as appropriate  - Instruct patient on fluid and nutrition restrictions as appropriate  7/15/2024 0538 by Jhonathan Lange RN  Outcome: Progressing  7/15/2024 0537 by Jhonathan Lange RN  Outcome: Progressing     Problem: SKIN/TISSUE INTEGRITY - ADULT  Goal: Skin integrity remains intact  Description: INTERVENTIONS  - Assess and document risk factors for pressure ulcer development  - Assess and document skin integrity  - Monitor for areas of redness and/or skin breakdown  - Initiate interventions, skin care algorithm/standards of care as needed  7/15/2024 0538 by Jhonathan Lange RN  Outcome: Progressing  7/15/2024 0537 by Jhonathan Lange RN  Outcome: Progressing  Goal: Incision(s), wounds(s) or drain site(s) healing without S/S of  infection  Description: INTERVENTIONS:  - Assess and document risk factors for pressure ulcer development  - Assess and document skin integrity  - Assess and document dressing/incision, wound bed, drain sites and surrounding tissue  - Implement wound care per orders  - Initiate isolation precautions as appropriate  - Initiate Pressure Ulcer prevention bundle as indicated  7/15/2024 0538 by Jhonathan Lange RN  Outcome: Progressing  7/15/2024 0537 by Jhonathan Lange, RN  Outcome: Progressing     Problem: HEMATOLOGIC - ADULT  Goal: Maintains hematologic stability  Description: INTERVENTIONS  - Assess for signs and symptoms of bleeding or hemorrhage  - Monitor labs and vital signs for trends  - Administer supportive blood products/factors, fluids and medications as ordered and appropriate  - Administer supportive blood products/factors as ordered and appropriate  7/15/2024 0538 by Jhonathan Lange RN  Outcome: Progressing  7/15/2024 0537 by Jhonathan Lange RN  Outcome: Progressing

## 2024-07-15 NOTE — PLAN OF CARE
No acute change at this time. Plan for discharge to [facility] with home HHC and outpatient IV abx at [facility] infusion center. Picc placed to right arm today. Safety precautions in place. Call light within reach.   Problem: Patient Centered Care  Goal: Patient preferences are identified and integrated in the patient's plan of care  Description: Interventions:  - What would you like us to know as we care for you? Home with daughter  - Provide timely, complete, and accurate information to patient/family  - Incorporate patient and family knowledge, values, beliefs, and cultural backgrounds into the planning and delivery of care  - Encourage patient/family to participate in care and decision-making at the level they choose  - Honor patient and family perspectives and choices  Outcome: Progressing     Problem: Diabetes/Glucose Control  Goal: Glucose maintained within prescribed range  Description: INTERVENTIONS:  - Monitor Blood Glucose as ordered  - Assess for signs and symptoms of hyperglycemia and hypoglycemia  - Administer ordered medications to maintain glucose within target range  - Assess barriers to adequate nutritional intake and initiate nutrition consult as needed  - Instruct patient on self management of diabetes  Outcome: Progressing     Problem: PAIN - ADULT  Goal: Verbalizes/displays adequate comfort level or patient's stated pain goal  Description: INTERVENTIONS:  - Encourage pt to monitor pain and request assistance  - Assess pain using appropriate pain scale  - Administer analgesics based on type and severity of pain and evaluate response  - Implement non-pharmacological measures as appropriate and evaluate response  - Consider cultural and social influences on pain and pain management  - Manage/alleviate anxiety  - Utilize distraction and/or relaxation techniques  - Monitor for opioid side effects  - Notify MD/LIP if interventions unsuccessful or patient reports new pain  - Anticipate increased pain  with activity and pre-medicate as appropriate  Outcome: Progressing     Problem: RISK FOR INFECTION - ADULT  Goal: Absence of fever/infection during anticipated neutropenic period  Description: INTERVENTIONS  - Monitor WBC  - Administer growth factors as ordered  - Implement neutropenic guidelines  Outcome: Progressing     Problem: DISCHARGE PLANNING  Goal: Discharge to home or other facility with appropriate resources  Description: INTERVENTIONS:  - Identify barriers to discharge w/pt and caregiver  - Include patient/family/discharge partner in discharge planning  - Arrange for needed discharge resources and transportation as appropriate  - Identify discharge learning needs (meds, wound care, etc)  - Arrange for interpreters to assist at discharge as needed  - Consider post-discharge preferences of patient/family/discharge partner  - Complete POLST form as appropriate  - Assess patient's ability to be responsible for managing their own health  - Refer to Case Management Department for coordinating discharge planning if the patient needs post-hospital services based on physician/LIP order or complex needs related to functional status, cognitive ability or social support system  Outcome: Progressing     Problem: METABOLIC/FLUID AND ELECTROLYTES - ADULT  Goal: Glucose maintained within prescribed range  Description: INTERVENTIONS:  - Monitor Blood Glucose as ordered  - Assess for signs and symptoms of hyperglycemia and hypoglycemia  - Administer ordered medications to maintain glucose within target range  - Assess barriers to adequate nutritional intake and initiate nutrition consult as needed  - Instruct patient on self management of diabetes  Outcome: Progressing  Goal: Electrolytes maintained within normal limits  Description: INTERVENTIONS:  - Monitor labs and rhythm and assess patient for signs and symptoms of electrolyte imbalances  - Administer electrolyte replacement as ordered  - Monitor response to  electrolyte replacements, including rhythm and repeat lab results as appropriate  - Fluid restriction as ordered  - Instruct patient on fluid and nutrition restrictions as appropriate  Outcome: Progressing  Goal: Hemodynamic stability and optimal renal function maintained  Description: INTERVENTIONS:  - Monitor labs and assess for signs and symptoms of volume excess or deficit  - Monitor intake, output and patient weight  - Monitor urine specific gravity, serum osmolarity and serum sodium as indicated or ordered  - Monitor response to interventions for patient's volume status, including labs, urine output, blood pressure (other measures as available)  - Encourage oral intake as appropriate  - Instruct patient on fluid and nutrition restrictions as appropriate  Outcome: Progressing     Problem: SKIN/TISSUE INTEGRITY - ADULT  Goal: Skin integrity remains intact  Description: INTERVENTIONS  - Assess and document risk factors for pressure ulcer development  - Assess and document skin integrity  - Monitor for areas of redness and/or skin breakdown  - Initiate interventions, skin care algorithm/standards of care as needed  Outcome: Progressing  Goal: Incision(s), wounds(s) or drain site(s) healing without S/S of infection  Description: INTERVENTIONS:  - Assess and document risk factors for pressure ulcer development  - Assess and document skin integrity  - Assess and document dressing/incision, wound bed, drain sites and surrounding tissue  - Implement wound care per orders  - Initiate isolation precautions as appropriate  - Initiate Pressure Ulcer prevention bundle as indicated  Outcome: Progressing     Problem: HEMATOLOGIC - ADULT  Goal: Maintains hematologic stability  Description: INTERVENTIONS  - Assess for signs and symptoms of bleeding or hemorrhage  - Monitor labs and vital signs for trends  - Administer supportive blood products/factors, fluids and medications as ordered and appropriate  - Administer supportive  blood products/factors as ordered and appropriate  Outcome: Progressing

## 2024-07-15 NOTE — PROGRESS NOTES
[facility] Hospitalist Progress Note     CC: Hospital Follow up    PCP: Brittny Patino DO       Assessment/Plan:     Principal Problem:    Cellulitis of right lower extremity    Mr. Mcgarry is a 74 year old male with PMH sig for type 2 DM, hs of left BKA, carotid artery stenosis, MDD, mobid obesity, CKD stage 3, who presents with recurrent right lower ext cellulitis with MRSA. PICC line with plans for 2 weeks iv vanco.      Right lower extremity cellulitis  Leucocytosis   MRSA  - erythema noted, not improving with doxy, WBC 13 on admit  - IV Vancomycin and zosyn started  - will continue with Vanco   - BC pending and wound cultures MRSA   - ID and wound care consulted  - elevated as tolerated   - PICC line with plans for 2 weeks iv vanco     Type 2 DM  - A1c 8.3  - on novalog 20 TID, and treciba 40 units  - SSI and accuchecks     Mild KARINA on CKD stage 3  - cre 1.5 on admit, at baseline range   - oral hydration   - monitor      HTN  - amlodipine 5 mg     Carotid artery disease  PAD  - see vascular Dr. Zepeda  - continue plavix     Hs of left BKA  - prosthetic in place     Depression   - continue home meds     Morbid obesity  - weight loss endevours as OP     FN:  - IVF: none  - Diet: diabetic diet     DVT Prophy: scd, heparin  Lines: PIV     Dispo: pending clinical course  Lives in independent living   Will need HH for wound care and iv abx      Outpatient records or previous hospital records reviewed.      Further recommendations pending patient's clinical course.  [facility] hospitalist to continue to follow patient while in house     Patient and/or patient's family given opportunity to ask questions and note understanding and agreeing with therapeutic plan as outlined     Thank You,  Jonathan Amado MD     Hospitalist with [facility]  Answering Service number: [phone]     Subjective:     No CP, SOB, or N/V.  Not much pain in the left leg.   Redness has improved.     OBJECTIVE:    Blood pressure 153/68, pulse 86,  temperature 98.1 °F (36.7 °C), temperature source Oral, resp. rate 18, height 5' 6\" (1.676 m), weight 287 lb 8 oz (130.4 kg), SpO2 92%.    Temp:  [97.9 °F (36.6 °C)-98.3 °F (36.8 °C)] 98.1 °F (36.7 °C)  Pulse:  [82-91] 86  Resp:  [18-20] 18  BP: (117-153)/(61-75) 153/68  SpO2:  [91 %-92 %] 92 %      Intake/Output:    Intake/Output Summary (Last 24 hours) at 7/15/2024 1012  Last data filed at 7/14/2024 2110  Gross per 24 hour   Intake 500 ml   Output --   Net 500 ml       Last 3 Weights   07/10/24 2121 287 lb 8 oz (130.4 kg)   07/10/24 1954 293 lb 3.4 oz (133 kg)   01/14/22 1054 277 lb 11.2 oz (126 kg)   10/12/21 0856 300 lb (136.1 kg)       Exam   General:  Alert, no distress   Head:  NC/AT   Eyes:  Sclera anicteric, No conjunctival pallor,     Nose: Nares normal. Mucosa normal.    Throat: Lips normal. Teeth and gums normal.   Neck: Supple   Lungs:   Clear to auscultation bilaterally. Normal effort   Chest wall:  No tenderness or deformity.   Heart:  Regular rate and rhythm, S1, S2 normal   Abdomen:   Soft, non-tender. Bowel sounds normal. Non distended   Extremities: Right lower with dressing, redness has improved, left lower ext with prosthetic    Skin: Skin color, texture, turgor normal. No rashes or lesions.    Neurologic: Normal strength, no focal deficit appreciated         Data Review:       Labs:     Recent Labs   Lab 07/10/24  1954 07/11/24  0319 07/13/24  0507 07/14/24  0556 07/15/24  0655   RBC 5.61   < > 5.07 5.15 5.36   HGB 16.3   < > 14.4 14.6 15.3   HCT 48.1   < > 45.6 47.4 48.0   MCV 85.7   < > 89.9 92.0 89.6   MCH 29.1   < > 28.4 28.3 28.5   MCHC 33.9   < > 31.6 30.8* 31.9   RDW 14.6   < > 14.9 14.7 14.6   NEPRELIM 9.25*  --   --   --   --    WBC 13.5*   < > 11.7* 11.6* 11.3*   .0*   < > 609.0* 594.0* 598.0*    < > = values in this interval not displayed.         Recent Labs   Lab 07/13/24  0507 07/14/24  0556 07/15/24  0655   * 180* 217*   BUN 25* 27* 29*   CREATSERUM 1.57* 1.54*  1.55*   EGFRCR 46* 47* 46*   CA 9.0 9.2 9.7    140 138   K 4.5 4.5 4.6    106 105   CO2 26.0 26.0 25.0       No results for input(s): \"ALT\", \"AST\", \"ALB\", \"AMYLASE\", \"LIPASE\", \"LDH\" in the last 168 hours.    Invalid input(s): \"ALPHOS\", \"TBIL\", \"DBIL\", \"TPROT\"      Imaging:  No results found.      Meds:      heparin  7,500 Units Subcutaneous Q8H JAYDEN    vancomycin  2,000 mg Intravenous Q24H    amLODIPine  5 mg Oral Daily    buPROPion ER  150 mg Oral Daily    gabapentin  100 mg Oral TID    pantoprazole  40 mg Oral QAM AC    insulin degludec  40 Units Subcutaneous Nightly    clopidogrel  75 mg Oral Daily    gemfibrozil  600 mg Oral Daily    rosuvastatin  20 mg Oral Nightly    insulin aspart  1-5 Units Subcutaneous TID CC    insulin aspart  20 Units Subcutaneous TID CC         acetaminophen    melatonin    polyethylene glycol (PEG 3350)    sennosides    bisacodyl    ondansetron    acetaminophen    glucose **OR** glucose **OR** glucose-vitamin C **OR** dextrose **OR** glucose **OR** glucose **OR** glucose-vitamin C

## 2024-07-15 NOTE — PROGRESS NOTES
[facility]  part of [facility] Infectious Disease Progress Note    Connor Mcgarry Patient Status:  Inpatient    1950 MRN [id number]   Location [facility] 5SW/SE Attending Jonathan Amado MD   Hosp Day # 5 PCP Brittny Patino DO     Subjective:  Chart reviewed, no acute events.  Pt seen bedside.  PICC just placed.  He is doing ok.  Feels the redness is improved since he got admitted and the drainage is less.   with dressing changes or when touching the leg.  No fevers.     Objective:    Allergies:  No Known Allergies    Medications:    Current Facility-Administered Medications:     heparin (Porcine) 5000 UNIT/ML injection 7,500 Units, 7,500 Units, Subcutaneous, Q8H JAYDEN    vancomycin (Vancocin) 2 g in sodium chloride 0.9% 500mL IVPB premix, 2,000 mg, Intravenous, Q24H    acetaminophen (Tylenol Extra Strength) tab 1,000 mg, 1,000 mg, Oral, Q6H PRN    melatonin tab 3 mg, 3 mg, Oral, Nightly PRN    polyethylene glycol (PEG 3350) (Miralax) 17 g oral packet 17 g, 17 g, Oral, Daily PRN    sennosides (Senokot) tab 17.2 mg, 17.2 mg, Oral, Nightly PRN    bisacodyl (Dulcolax) 10 MG rectal suppository 10 mg, 10 mg, Rectal, Daily PRN    ondansetron (Zofran) 4 MG/2ML injection 4 mg, 4 mg, Intravenous, Q6H PRN    acetaminophen (Tylenol) tab 650 mg, 650 mg, Oral, Q6H PRN    amLODIPine (Norvasc) tab 5 mg, 5 mg, Oral, Daily    buPROPion ER (Wellbutrin XL) 24 hr tab 150 mg, 150 mg, Oral, Daily    gabapentin (Neurontin) cap 100 mg, 100 mg, Oral, TID    pantoprazole (Protonix) DR tab 40 mg, 40 mg, Oral, QAM AC    insulin degludec (Tresiba) 100 units/mL flextouch 40 Units, 40 Units, Subcutaneous, Nightly    clopidogrel (Plavix) tab 75 mg, 75 mg, Oral, Daily    gemfibrozil (Lopid) tab 600 mg, 600 mg, Oral, Daily    rosuvastatin (Crestor) tab 20 mg, 20 mg, Oral, Nightly    glucose (Dex4) 15 GM/59ML oral liquid 15 g, 15 g, Oral, Q15 Min PRN **OR** glucose (Glutose) 40% oral gel 15 g,  15 g, Oral, Q15 Min PRN **OR** glucose-vitamin C (Dex-4) chewable tab 4 tablet, 4 tablet, Oral, Q15 Min PRN **OR** dextrose 50% injection 50 mL, 50 mL, Intravenous, Q15 Min PRN **OR** glucose (Dex4) 15 GM/59ML oral liquid 30 g, 30 g, Oral, Q15 Min PRN **OR** glucose (Glutose) 40% oral gel 30 g, 30 g, Oral, Q15 Min PRN **OR** glucose-vitamin C (Dex-4) chewable tab 8 tablet, 8 tablet, Oral, Q15 Min PRN    insulin aspart (NovoLOG) 100 Units/mL FlexPen 1-5 Units, 1-5 Units, Subcutaneous, TID CC    insulin aspart (NovoLOG) 100 Units/mL FlexPen 20 Units, 20 Units, Subcutaneous, TID CC    Physical Exam:  General: Alert, orientated x3.  Cooperative.  No apparent distress.  Vital Signs:  Blood pressure 153/68, pulse 86, temperature 98.1 °F (36.7 °C), temperature source Oral, resp. rate 18, height 5' 6\" (1.676 m), weight 287 lb 8 oz (130.4 kg), SpO2 92%.   Temp (24hrs), Av.1 °F (36.7 °C), Min:97.9 °F (36.6 °C), Max:98.3 °F (36.8 °C)      HEENT: Exam is unremarkable.  Without scleral icterus.  Mucous membranes are moist. PERRLA.  Oropharynx is clear.  Lungs: Clear to auscultation bilaterally.  Cardiac: Regular rate   Abdomen:  Soft, non-distended, non-tender, with no rebound or guarding.     Extremities: RLE wrapped,  superior area under wrapping visible and mildly erythematous and warm to touch  Skin: Normal texture and turgor.  Neurologic: Cranial nerves are grossly intact.      Labs:  Lab Results   Component Value Date    WBC 11.3 07/15/2024    HGB 15.3 07/15/2024    HCT 48.0 07/15/2024    .0 07/15/2024    CREATSERUM 1.55 07/15/2024    BUN 29 07/15/2024     07/15/2024    K 4.6 07/15/2024     07/15/2024    CO2 25.0 07/15/2024     07/15/2024    CA 9.7 07/15/2024               Assessment/Plan:    1.  Complicated RLE cellulitis   -admitted with worsening blisters and drainage  -blood cultures negative  -wound cultures with MRSA  -ongoing wound care and compression  -on IV Vancomycin    2.  Leukocytosis  -due to above  -at 11.3 yesterday, will trend  -afebrile     3. DM II  -pt needs tight glycemic control for wound healing    DISPO:  PICC in place and plan for 2 more weeks of IV Vancomycin 2gm daily with pharm to dose, tentative EOT 7/29/24.  Weekly CBC, CMP, CRP and Vanco trough per protocol.  Paper rx left, will enter into [facility] infusion center therapy plan as well.  Pt should follow up in 7-10 days; He reports he can get transportation on Tuesdays so will try to follow up next Tuesday in clinic.  Continue local wound care and compression/elevation as able.  D/w patient bedside.      If you have any questions or concerns please call [facility] at [phone].     ALICIA Saenz  7/15/2024  9:03 AM

## 2024-07-16 LAB
ANION GAP SERPL CALC-SCNC: 6 MMOL/L (ref 0–18)
BUN BLD-MCNC: 31 MG/DL (ref 9–23)
CALCIUM BLD-MCNC: 9.3 MG/DL (ref 8.7–10.4)
CHLORIDE SERPL-SCNC: 105 MMOL/L (ref 98–112)
CO2 SERPL-SCNC: 28 MMOL/L (ref 21–32)
CREAT BLD-MCNC: 1.65 MG/DL
DEPRECATED RDW RBC AUTO: 46.1 FL (ref 35.1–46.3)
EGFRCR SERPLBLD CKD-EPI 2021: 43 ML/MIN/1.73M2 (ref 60–?)
ERYTHROCYTE [DISTWIDTH] IN BLOOD BY AUTOMATED COUNT: 14.5 % (ref 11–15)
GLUCOSE BLD-MCNC: 252 MG/DL (ref 70–99)
GLUCOSE BLDC GLUCOMTR-MCNC: 155 MG/DL (ref 70–99)
GLUCOSE BLDC GLUCOMTR-MCNC: 215 MG/DL (ref 70–99)
GLUCOSE BLDC GLUCOMTR-MCNC: 239 MG/DL (ref 70–99)
GLUCOSE BLDC GLUCOMTR-MCNC: 264 MG/DL (ref 70–99)
HCT VFR BLD AUTO: 45.3 %
HGB BLD-MCNC: 14.9 G/DL
MCH RBC QN AUTO: 29 PG (ref 26–34)
MCHC RBC AUTO-ENTMCNC: 32.9 G/DL (ref 31–37)
MCV RBC AUTO: 88.3 FL
PLATELET # BLD AUTO: 522 10(3)UL (ref 150–450)
POTASSIUM SERPL-SCNC: 4.9 MMOL/L (ref 3.5–5.1)
RBC # BLD AUTO: 5.13 X10(6)UL
SODIUM SERPL-SCNC: 139 MMOL/L (ref 136–145)
WBC # BLD AUTO: 12.6 X10(3) UL (ref 4–11)

## 2024-07-16 PROCEDURE — 80048 BASIC METABOLIC PNL TOTAL CA: CPT | Performed by: INTERNAL MEDICINE

## 2024-07-16 PROCEDURE — 85027 COMPLETE CBC AUTOMATED: CPT | Performed by: INTERNAL MEDICINE

## 2024-07-16 PROCEDURE — 82962 GLUCOSE BLOOD TEST: CPT

## 2024-07-16 RX ORDER — VANCOMYCIN/0.9 % SOD CHLORIDE 1 G/100 ML
2 PLASTIC BAG, INJECTION (ML) INTRAVENOUS EVERY 24 HOURS
Qty: 28 EACH | Refills: 0 | Status: SHIPPED | OUTPATIENT
Start: 2024-07-16 | End: 2024-07-30

## 2024-07-16 NOTE — PLAN OF CARE
Patient has safety precautions in place bed in the lowest position, bed alarm on, and call light within reach. Plan of care ongoing. No further concerns as of present.    Problem: Patient Centered Care  Goal: Patient preferences are identified and integrated in the patient's plan of care  Description: Interventions:  - What would you like us to know as we care for you? Home with daughter  - Provide timely, complete, and accurate information to patient/family  - Incorporate patient and family knowledge, values, beliefs, and cultural backgrounds into the planning and delivery of care  - Encourage patient/family to participate in care and decision-making at the level they choose  - Honor patient and family perspectives and choices  Outcome: Progressing     Problem: Diabetes/Glucose Control  Goal: Glucose maintained within prescribed range  Description: INTERVENTIONS:  - Monitor Blood Glucose as ordered  - Assess for signs and symptoms of hyperglycemia and hypoglycemia  - Administer ordered medications to maintain glucose within target range  - Assess barriers to adequate nutritional intake and initiate nutrition consult as needed  - Instruct patient on self management of diabetes  Outcome: Progressing     Problem: PAIN - ADULT  Goal: Verbalizes/displays adequate comfort level or patient's stated pain goal  Description: INTERVENTIONS:  - Encourage pt to monitor pain and request assistance  - Assess pain using appropriate pain scale  - Administer analgesics based on type and severity of pain and evaluate response  - Implement non-pharmacological measures as appropriate and evaluate response  - Consider cultural and social influences on pain and pain management  - Manage/alleviate anxiety  - Utilize distraction and/or relaxation techniques  - Monitor for opioid side effects  - Notify MD/LIP if interventions unsuccessful or patient reports new pain  - Anticipate increased pain with activity and pre-medicate as  appropriate  Outcome: Progressing     Problem: RISK FOR INFECTION - ADULT  Goal: Absence of fever/infection during anticipated neutropenic period  Description: INTERVENTIONS  - Monitor WBC  - Administer growth factors as ordered  - Implement neutropenic guidelines  Outcome: Progressing     Problem: DISCHARGE PLANNING  Goal: Discharge to home or other facility with appropriate resources  Description: INTERVENTIONS:  - Identify barriers to discharge w/pt and caregiver  - Include patient/family/discharge partner in discharge planning  - Arrange for needed discharge resources and transportation as appropriate  - Identify discharge learning needs (meds, wound care, etc)  - Arrange for interpreters to assist at discharge as needed  - Consider post-discharge preferences of patient/family/discharge partner  - Complete POLST form as appropriate  - Assess patient's ability to be responsible for managing their own health  - Refer to Case Management Department for coordinating discharge planning if the patient needs post-hospital services based on physician/LIP order or complex needs related to functional status, cognitive ability or social support system  Outcome: Progressing     Problem: METABOLIC/FLUID AND ELECTROLYTES - ADULT  Goal: Glucose maintained within prescribed range  Description: INTERVENTIONS:  - Monitor Blood Glucose as ordered  - Assess for signs and symptoms of hyperglycemia and hypoglycemia  - Administer ordered medications to maintain glucose within target range  - Assess barriers to adequate nutritional intake and initiate nutrition consult as needed  - Instruct patient on self management of diabetes  Outcome: Progressing  Goal: Electrolytes maintained within normal limits  Description: INTERVENTIONS:  - Monitor labs and rhythm and assess patient for signs and symptoms of electrolyte imbalances  - Administer electrolyte replacement as ordered  - Monitor response to electrolyte replacements, including rhythm  and repeat lab results as appropriate  - Fluid restriction as ordered  - Instruct patient on fluid and nutrition restrictions as appropriate  Outcome: Progressing  Goal: Hemodynamic stability and optimal renal function maintained  Description: INTERVENTIONS:  - Monitor labs and assess for signs and symptoms of volume excess or deficit  - Monitor intake, output and patient weight  - Monitor urine specific gravity, serum osmolarity and serum sodium as indicated or ordered  - Monitor response to interventions for patient's volume status, including labs, urine output, blood pressure (other measures as available)  - Encourage oral intake as appropriate  - Instruct patient on fluid and nutrition restrictions as appropriate  Outcome: Progressing     Problem: SKIN/TISSUE INTEGRITY - ADULT  Goal: Skin integrity remains intact  Description: INTERVENTIONS  - Assess and document risk factors for pressure ulcer development  - Assess and document skin integrity  - Monitor for areas of redness and/or skin breakdown  - Initiate interventions, skin care algorithm/standards of care as needed  Outcome: Progressing  Goal: Incision(s), wounds(s) or drain site(s) healing without S/S of infection  Description: INTERVENTIONS:  - Assess and document risk factors for pressure ulcer development  - Assess and document skin integrity  - Assess and document dressing/incision, wound bed, drain sites and surrounding tissue  - Implement wound care per orders  - Initiate isolation precautions as appropriate  - Initiate Pressure Ulcer prevention bundle as indicated  Outcome: Progressing     Problem: HEMATOLOGIC - ADULT  Goal: Maintains hematologic stability  Description: INTERVENTIONS  - Assess for signs and symptoms of bleeding or hemorrhage  - Monitor labs and vital signs for trends  - Administer supportive blood products/factors, fluids and medications as ordered and appropriate  - Administer supportive blood products/factors as ordered and  appropriate  Outcome: Progressing

## 2024-07-16 NOTE — PLAN OF CARE
Connor is alert/oriented. Vitals stable. RLE dressing changed as ordered. Spandagrip replaced after dressing change. LLE prosthetic intact. Tolerating diet, accuchecks covered via scheduled doses and sliding scale. Ambulating independently. Vanco for antibiotic coverage. Plavix and heparin for DVT prophylaxis. Voiding freely. Waiting on referral for outpatient abx. Pt and daughter updated on plan of care. Bed low, locked, all safety measures in place.    Problem: Patient Centered Care  Goal: Patient preferences are identified and integrated in the patient's plan of care  Description: Interventions:  - What would you like us to know as we care for you? Home with daughter  - Provide timely, complete, and accurate information to patient/family  - Incorporate patient and family knowledge, values, beliefs, and cultural backgrounds into the planning and delivery of care  - Encourage patient/family to participate in care and decision-making at the level they choose  - Honor patient and family perspectives and choices  Outcome: Adequate for Discharge     Problem: Diabetes/Glucose Control  Goal: Glucose maintained within prescribed range  Description: INTERVENTIONS:  - Monitor Blood Glucose as ordered  - Assess for signs and symptoms of hyperglycemia and hypoglycemia  - Administer ordered medications to maintain glucose within target range  - Assess barriers to adequate nutritional intake and initiate nutrition consult as needed  - Instruct patient on self management of diabetes  Outcome: Adequate for Discharge     Problem: PAIN - ADULT  Goal: Verbalizes/displays adequate comfort level or patient's stated pain goal  Description: INTERVENTIONS:  - Encourage pt to monitor pain and request assistance  - Assess pain using appropriate pain scale  - Administer analgesics based on type and severity of pain and evaluate response  - Implement non-pharmacological measures as appropriate and evaluate response  - Consider cultural and  social influences on pain and pain management  - Manage/alleviate anxiety  - Utilize distraction and/or relaxation techniques  - Monitor for opioid side effects  - Notify MD/LIP if interventions unsuccessful or patient reports new pain  - Anticipate increased pain with activity and pre-medicate as appropriate  Outcome: Adequate for Discharge     Problem: RISK FOR INFECTION - ADULT  Goal: Absence of fever/infection during anticipated neutropenic period  Description: INTERVENTIONS  - Monitor WBC  - Administer growth factors as ordered  - Implement neutropenic guidelines  Outcome: Adequate for Discharge     Problem: DISCHARGE PLANNING  Goal: Discharge to home or other facility with appropriate resources  Description: INTERVENTIONS:  - Identify barriers to discharge w/pt and caregiver  - Include patient/family/discharge partner in discharge planning  - Arrange for needed discharge resources and transportation as appropriate  - Identify discharge learning needs (meds, wound care, etc)  - Arrange for interpreters to assist at discharge as needed  - Consider post-discharge preferences of patient/family/discharge partner  - Complete POLST form as appropriate  - Assess patient's ability to be responsible for managing their own health  - Refer to Case Management Department for coordinating discharge planning if the patient needs post-hospital services based on physician/LIP order or complex needs related to functional status, cognitive ability or social support system  Outcome: Adequate for Discharge     Problem: METABOLIC/FLUID AND ELECTROLYTES - ADULT  Goal: Glucose maintained within prescribed range  Description: INTERVENTIONS:  - Monitor Blood Glucose as ordered  - Assess for signs and symptoms of hyperglycemia and hypoglycemia  - Administer ordered medications to maintain glucose within target range  - Assess barriers to adequate nutritional intake and initiate nutrition consult as needed  - Instruct patient on self  management of diabetes  Outcome: Adequate for Discharge  Goal: Electrolytes maintained within normal limits  Description: INTERVENTIONS:  - Monitor labs and rhythm and assess patient for signs and symptoms of electrolyte imbalances  - Administer electrolyte replacement as ordered  - Monitor response to electrolyte replacements, including rhythm and repeat lab results as appropriate  - Fluid restriction as ordered  - Instruct patient on fluid and nutrition restrictions as appropriate  Outcome: Adequate for Discharge  Goal: Hemodynamic stability and optimal renal function maintained  Description: INTERVENTIONS:  - Monitor labs and assess for signs and symptoms of volume excess or deficit  - Monitor intake, output and patient weight  - Monitor urine specific gravity, serum osmolarity and serum sodium as indicated or ordered  - Monitor response to interventions for patient's volume status, including labs, urine output, blood pressure (other measures as available)  - Encourage oral intake as appropriate  - Instruct patient on fluid and nutrition restrictions as appropriate  Outcome: Adequate for Discharge     Problem: SKIN/TISSUE INTEGRITY - ADULT  Goal: Skin integrity remains intact  Description: INTERVENTIONS  - Assess and document risk factors for pressure ulcer development  - Assess and document skin integrity  - Monitor for areas of redness and/or skin breakdown  - Initiate interventions, skin care algorithm/standards of care as needed  Outcome: Adequate for Discharge  Goal: Incision(s), wounds(s) or drain site(s) healing without S/S of infection  Description: INTERVENTIONS:  - Assess and document risk factors for pressure ulcer development  - Assess and document skin integrity  - Assess and document dressing/incision, wound bed, drain sites and surrounding tissue  - Implement wound care per orders  - Initiate isolation precautions as appropriate  - Initiate Pressure Ulcer prevention bundle as indicated  Outcome:  Adequate for Discharge     Problem: HEMATOLOGIC - ADULT  Goal: Maintains hematologic stability  Description: INTERVENTIONS  - Assess for signs and symptoms of bleeding or hemorrhage  - Monitor labs and vital signs for trends  - Administer supportive blood products/factors, fluids and medications as ordered and appropriate  - Administer supportive blood products/factors as ordered and appropriate  Outcome: Adequate for Discharge

## 2024-07-16 NOTE — DISCHARGE SUMMARY
General Medicine Discharge Summary     Patient ID:  Connor Mcgarry  74 year old  6/24/1950    Admit date: 7/10/2024    Discharge date and time: 7/17/24    Attending Physician: Jonathan Amado MD     Consults: IP CONSULT TO PHARMACY  IP CONSULT TO INFECTIOUS DISEASE  IP CONSULT TO PHARMACY  CONSULT TO WOUND OSTOMY  IP CONSULT TO VASCULAR ACCESS TEAM  IP CONSULT TO SOCIAL WORK  IP CONSULT TO SOCIAL WORK    Primary Care Physician: Brittny Patino DO     Reason for admission: Right leg cellulitis     Risk For Readmission: low    Discharge Diagnoses: Cellulitis of right lower extremity [L03.115]  See Additional Discharge Diagnoses in Hospital Course    Discharged Condition: stable    Follow-up with labs/images appointments: PCP, ID    Risk patient: Coordinator contacted for pcp apt     Exam  Gen: No acute distress  Pulm: Lungs clear, normal respiratory effort  CV: Heart with regular rate and rhythm  Abd: Abdomen soft,   Ext: Right lower with dressing, redness has improved, left lower ext with prosthetic     HPI: per chart  Mr. Mcgarry is a 74 year old male with PMH sig for type 2 DM, hs of left BKA, carotid artery stenosis, MDD, mobid obesity, CKD stage 3, who presents with recurrent right lower ext cellulitis.  Patient has had a history of cellulitis of his right lower extremity, recently treated with antibiotics with resolution, but had recurrence of symptoms redness and pain about 1 week ago, started on doxycycline, with minimal improvement, significant drainage surrounding the wound, therefore was recommended to come to the ER.  He denies any fever or chills, no chest pain or shortness of breath, no headaches or vision changes, no lightheadedness or dizziness, no other complaints at this time.     Hospital Course:   Mr. Mcgarry is a 74 year old male with PMH sig for type 2 DM, hs of left BKA, carotid artery stenosis, MDD, mobid obesity, CKD stage 3, who presents with recurrent right lower ext cellulitis with MRSA. Seen by  ID, PICC line with plans for 2 weeks iv vanco at infusion center.      Right lower extremity cellulitis  Leucocytosis   MRSA  - erythema noted, not improving with doxy, WBC 13 on admit  - IV Vancomycin and zosyn started  - will continue with Vanco   - BC no growth and wound cultures MRSA   - ID and wound care consulted  - elevated as tolerated   - PICC line with plans for 2 weeks iv vanco     Type 2 DM  - A1c 8.3  - on novalog 20 TID, and treciba 40 units  - SSI and accuchecks     Mild KARINA on CKD stage 3  - cre 1.5 on admit, at baseline range   - oral hydration   - monitor      HTN  - amlodipine 5 mg     Carotid artery disease  PAD  - see vascular Dr. Zepeda  - continue plavix     Hs of left BKA  - prosthetic in place     Depression   - continue home meds     Morbid obesity  - weight loss endevours as OP    Operative Procedures:      Imaging: No results found.    Disposition: home    Activity: as tolerated   Diet: general   Wound Care: no needs  Code Status: Full Code  O2: no needs    Home Medication Changes: as below   All discharge medications have been reconciled with current medication list.     Med list     Medication List        START taking these medications      Vancomycin HCl in NaCl 1-0.9 GM/250ML-% Soln  Inject 500 mL (2 g total) into the vein daily for 14 days.            CHANGE how you take these medications      NovoLOG FlexPen 100 UNIT/ML Sopn  Generic drug: insulin aspart  INJECT 10 UNITS BEFORE EACH MEAL THREE TIMES DAILY PLUS CORRECTION AS DIRECTED. MAX DAILY DOSE IS 75 UNITS  What changed:   how much to take  how to take this  when to take this  additional instructions            CONTINUE taking these medications      amLODIPine 5 MG Tabs  Commonly known as: Norvasc  Take 1 tablet (5 mg total) by mouth daily.     BD Swab Single Use Regular Pads  APPLY TOPICALLY FOUR TIMES DAILY.     buPROPion  MG Tb24  Commonly known as: Wellbutrin XL  Take 1 tablet (150 mg total) by mouth daily.      clopidogrel 75 MG Tabs  Commonly known as: Plavix     gabapentin 100 MG Caps  Commonly known as: Neurontin  Take 1 capsule (100 mg total) by mouth 3 (three) times daily.     gemfibrozil 600 MG Tabs  Commonly known as: Lopid     Omeprazole 40 MG Cpdr  Take 1 capsule (40 mg total) by mouth daily.     Pen Needles 5/16\" 31G X 8 MM Misc  Inject 1 Act into the skin in the morning, at noon, and at bedtime.     rosuvastatin 20 MG Tabs  Commonly known as: Crestor     Tresiba FlexTouch 100 UNIT/ML Sopn  Generic drug: insulin degludec  Inject 66 Units/day into the skin daily.     True Metrix Blood Glucose Test Strp  1 strip by Finger stick route in the morning, at noon, and at bedtime.     True Metrix Meter w/Device Kit  USE AS DIRECTED     TRUEplus Lancets 33G Misc  1 lancet by Finger stick route in the morning, at noon, and at bedtime. Dx E11.9            STOP taking these medications      doxycycline 100 MG Caps  Commonly known as: Vibramycin               Where to Get Your Medications        You can get these medications from any pharmacy    Bring a paper prescription for each of these medications  Vancomycin HCl in NaCl 1-0.9 GM/250ML-% Soln            Follow-up Information       Brandin Vences APRN Follow up on 7/30/2024.    Specialty: Nurse Practitioner  Why: 7/30 at 12 pm via telemedicine  Contact information:  [address]  [address]  [place] [id number] [phone]               Brittny Patino DO Follow up on 7/18/2024.    Specialty: Family Medicine  Why: 7/18 at 6:30  Contact information:  [address]   [address]  [facility] [address] [phone]                             DC instructions:      Other Discharge Instructions:         Keep all follow up appointments and continue current medications.         Patient had opportunity to ask questions and state understand and agree with therapeutic plan as outlined    Thank You,    Jonathan Amado M.D.  [facility] Hospitalist

## 2024-07-16 NOTE — PROGRESS NOTES
[facility]  part of [facility] Infectious Disease  Progress Note    Connor Mcgarry Patient Status:  Inpatient    1950 MRN [id number]   Location [facility] 5SW/SE Attending Jonathan Amado MD   Hosp Day # 6 PCP Brittny Patino,      Subjective:  Patient seen/examined.  He is up in bed in NAD.  Discharge planning in progress for home with IV Rx at infusion center.    Objective:  Blood pressure 139/70, pulse 83, temperature 98.2 °F (36.8 °C), temperature source Oral, resp. rate 18, height 5' 6\" (1.676 m), weight 287 lb 8 oz (130.4 kg), SpO2 94%.    Intake/Output:    Intake/Output Summary (Last 24 hours) at 2024 0908  Last data filed at 2024 0604  Gross per 24 hour   Intake 1350 ml   Output 500 ml   Net 850 ml       Physical Exam:  General: Awake, alert, non-tox, NAD.  HEENT:  Oropharynx clear, trachea ML.  Heart: RRR S1S2 no murmurs.  Lungs: Essentially CTA b/l, no rhonchi, rales, wheezes.  Abdomen: Soft, NT/ND.  BS present.  No organomegaly.  Extremity: RLE erythema slowly improving.  Neurological: No focal deficits.  Derm:  Warm, dry, free from rashes.    Lab Data Review:  Lab Results   Component Value Date    WBC 12.6 2024    HGB 14.9 2024    HCT 45.3 2024    .0 2024    CREATSERUM 1.65 2024    BUN 31 2024     2024    K 4.9 2024     2024    CO2 28.0 2024     2024    CA 9.3 2024        Cultures:  7/10/24 blood cultures negative  7/10/24 wound cultures MRSA     Antibiotics Reviewed:  Vancomycin     Assessment and Plan:     Complicated RLE cellulitis with desquamation, blisters, and worsening drainage PTA  - Blood cultures negative  - Wound cultures with MRSA  - Local wound care ongoing  - IV vancomycin ongoing     2.  Multifactorial leukocytosis due to the above  - At 12K today and ~stable     3.  DM II  - Patient needs continued optimal glycemic  control     4.  Disposition - stable form ID for discharge today as planned.  Continue local wound care as Rx.  Continue IV vancomycin as Rx.  PICC in place and planning for 2 more weeks of IV vancomycin pending clinical course.  F/u with ID at EOT for PICC removal.  Patient agrees to this plan.    Jessika Farris DO, [facility] Infectious Disease  [phone]    7/16/2024  9:08 AM

## 2024-07-17 VITALS
DIASTOLIC BLOOD PRESSURE: 61 MMHG | BODY MASS INDEX: 46.21 KG/M2 | TEMPERATURE: 98 F | OXYGEN SATURATION: 92 % | HEART RATE: 86 BPM | HEIGHT: 66 IN | RESPIRATION RATE: 20 BRPM | WEIGHT: 287.5 LBS | SYSTOLIC BLOOD PRESSURE: 139 MMHG

## 2024-07-17 LAB
ANION GAP SERPL CALC-SCNC: 5 MMOL/L (ref 0–18)
BUN BLD-MCNC: 28 MG/DL (ref 9–23)
BUN/CREAT SERPL: 17.4 (ref 10–20)
CALCIUM BLD-MCNC: 9.5 MG/DL (ref 8.7–10.4)
CHLORIDE SERPL-SCNC: 106 MMOL/L (ref 98–112)
CO2 SERPL-SCNC: 28 MMOL/L (ref 21–32)
CREAT BLD-MCNC: 1.61 MG/DL
DEPRECATED RDW RBC AUTO: 47.7 FL (ref 35.1–46.3)
EGFRCR SERPLBLD CKD-EPI 2021: 44 ML/MIN/1.73M2 (ref 60–?)
ERYTHROCYTE [DISTWIDTH] IN BLOOD BY AUTOMATED COUNT: 14.6 % (ref 11–15)
GLUCOSE BLD-MCNC: 236 MG/DL (ref 70–99)
GLUCOSE BLDC GLUCOMTR-MCNC: 132 MG/DL (ref 70–99)
GLUCOSE BLDC GLUCOMTR-MCNC: 135 MG/DL (ref 70–99)
GLUCOSE BLDC GLUCOMTR-MCNC: 226 MG/DL (ref 70–99)
HCT VFR BLD AUTO: 45.5 %
HGB BLD-MCNC: 14.6 G/DL
MCH RBC QN AUTO: 28.9 PG (ref 26–34)
MCHC RBC AUTO-ENTMCNC: 32.1 G/DL (ref 31–37)
MCV RBC AUTO: 89.9 FL
OSMOLALITY SERPL CALC.SUM OF ELEC: 301 MOSM/KG (ref 275–295)
PLATELET # BLD AUTO: 552 10(3)UL (ref 150–450)
POTASSIUM SERPL-SCNC: 4.6 MMOL/L (ref 3.5–5.1)
RBC # BLD AUTO: 5.06 X10(6)UL
SODIUM SERPL-SCNC: 139 MMOL/L (ref 136–145)
WBC # BLD AUTO: 12.3 X10(3) UL (ref 4–11)

## 2024-07-17 PROCEDURE — 85027 COMPLETE CBC AUTOMATED: CPT | Performed by: INTERNAL MEDICINE

## 2024-07-17 PROCEDURE — 82962 GLUCOSE BLOOD TEST: CPT

## 2024-07-17 PROCEDURE — 80048 BASIC METABOLIC PNL TOTAL CA: CPT | Performed by: INTERNAL MEDICINE

## 2024-07-17 NOTE — SPIRITUAL CARE NOTE
Spiritual Care Visit Note    Patient Name: Connor Mcgarry Date of Spiritual Care Visit: 24   : 1950 Primary Dx: Cellulitis of right lower extremity       Referred By:      Spiritual Care Taxonomy:    Intended Effects: Promote a sense of peace    Methods: Offer emotional support    Interventions: Ask guided questions;Active listening    Visit Type/Summary:     - Spiritual Care: Consulted with RN prior to visit. Patient and family expressed appreciation for  visit.  remains available as needed for follow up.    Spiritual Care support can be requested via an Epic consult. For urgent/immediate needs, please contact the On Call  at: [place]: ext [phone]          Rev. Sunita Allen

## 2024-07-17 NOTE — CM/SW NOTE
KURT followed up on discharge planning.    No referral from PCP office faxed to KURT.    KURT sent full fax referral to [facility] [phone] requesting that the pharmacist follow-up ASAP.    KURT spoke to [facility]  who will message pharmacy to push this referral through ASAP.    KURT met with patient bedside.  Patient stated [facility] can transport him to infusion Friday 7/19.    KURT spoke to Jennifer supervisor  [phone]  with [facility]fusion who stated she will call [facility] pharmacy to have them enter therapy plan.    [facility] is the closest location to [facility]- patient confirmed [facility] can bring him.    Update 1:45 PM    SW received call from Haley roche/Hernan harden.  Per Brian De Leon as no availability until Saturday 7/20.    KURT confirmed [facility] has a Friday 7/19 appointment available at 2:00 PM.  Patient would then be able to go to [facility] for the rest of his appointments.      KURT spoke to [facility] transport who stated [facility] is out of their district.    Pending sale to [facility] does not go to any [facility] facilities.      PLAN: DC w/outpatient abx at [facility] pending appointment    / to remain available for support and/or discharge planning.     Ruthann Sauceda MSW, LSW [id number]

## 2024-07-17 NOTE — PROGRESS NOTES
Cleared to discharge per MDs with antibiotics per PICC line per ID. PICC patent right arm. Left prosthesis on.

## 2024-07-17 NOTE — CM/SW NOTE
07/17/24 1500   Discharge disposition   Expected discharge disposition Home-Health   Post Acute Care Provider [facility]   Outpatient services Infusion center  ([facility])   Discharge transportation Private car     KURT confirmed [facility] schedule with representative from [facility] scheduling office.  The patient's first infusion will be Friday 7/19 at 2:00 PM at the [place] location.  Subsequent locations will be between [facility], [place] and [place] for a duration of 9 remaining infusions.    KURT asked for the  at the [facility] infusion center to provide patient with a schedule of times/dates/locations at first appointment.    KURT met with patient bedside to confirm final plan.  The patient will work with his daughter for transportation to [place] appointments.  [facility] will provide transport to [facility] [place] on weekdays.    KURT spoke to Radha from [facility] and confirmed patient will discharge today.  Updated clinicals attached to [facility] referral.    KURT confirmed that [facility] contact information added to AVS.    Patient's daughter will provide transport to [place] today after IV abx dose.    PLAN: DC home with [facility] and [facility] outpatient abx infusions    Ruthann Sauceda MSW, LSW [id number]

## 2024-07-17 NOTE — PROGRESS NOTES
[facility] Hospitalist Progress Note     CC: Hospital Follow up    PCP: Brittny Patino DO       Assessment/Plan:     Principal Problem:    Cellulitis of right lower extremity    Mr. Mcgarry is a 74 year old male with PMH sig for type 2 DM, hs of left BKA, carotid artery stenosis, MDD, mobid obesity, CKD stage 3, who presents with recurrent right lower ext cellulitis with MRSA. PICC line with plans for 2 weeks iv vanco.      Right lower extremity cellulitis  Leucocytosis   MRSA  - erythema noted, not improving with doxy, WBC 13 on admit  - IV Vancomycin and zosyn started  - will continue with Vanco   - BC pending and wound cultures MRSA   - ID and wound care consulted  - elevated as tolerated   - PICC line with plans for 2 weeks iv vanco     Type 2 DM  - A1c 8.3  - on novalog 20 TID, and treciba 40 units  - SSI and accuchecks     Mild KARINA on CKD stage 3  - cre 1.5 on admit, at baseline range   - oral hydration   - monitor      HTN  - amlodipine 5 mg     Carotid artery disease  PAD  - see vascular Dr. Zepeda  - continue plavix     Hs of left BKA  - prosthetic in place     Depression   - continue home meds     Morbid obesity  - weight loss endevours as OP     FN:  - IVF: none  - Diet: diabetic diet     DVT Prophy: scd, heparin  Lines: PIV     Dispo: pending clinical course  Lives in independent living   Will need  for wound care   Patient requested an [place] [facility] because his ride service is covered for the [place] location.     Will provide a list of [facility] infusion centers to see if his ride service will cover transport to the [facility] infusion centers.      Outpatient records or previous hospital records reviewed.      Further recommendations pending patient's clinical course.  [facility] hospitalist to continue to follow patient while in house     Patient and/or patient's family given opportunity to ask questions and note understanding and agreeing with therapeutic plan as outlined     Thank You,  Jonathan Amado MD      Hospitalist with [facility]  Answering Service number: [phone]     Subjective:     No CP, SOB, or N/V.  Not much pain in the left leg.   Redness has improved.   Feels well.     OBJECTIVE:    Blood pressure 154/75, pulse 86, temperature 98 °F (36.7 °C), temperature source Oral, resp. rate 20, height 5' 6\" (1.676 m), weight 287 lb 8 oz (130.4 kg), SpO2 92%.    Temp:  [98 °F (36.7 °C)-98.3 °F (36.8 °C)] 98 °F (36.7 °C)  Pulse:  [83-86] 86  Resp:  [17-20] 20  BP: (132-154)/(59-75) 154/75  SpO2:  [92 %-94 %] 92 %      Intake/Output:    Intake/Output Summary (Last 24 hours) at 7/17/2024 1034  Last data filed at 7/16/2024 1700  Gross per 24 hour   Intake 240 ml   Output --   Net 240 ml       Last 3 Weights   07/10/24 2121 287 lb 8 oz (130.4 kg)   07/10/24 1954 293 lb 3.4 oz (133 kg)   01/14/22 1054 277 lb 11.2 oz (126 kg)   10/12/21 0856 300 lb (136.1 kg)       Exam   General:  Alert, no distress   Head:  NC/AT   Eyes:  Sclera anicteric, No conjunctival pallor,             Neck: Supple   Lungs:   Clear to auscultation bilaterally. Normal effort   Chest wall:  No tenderness or deformity.   Heart:  Regular rate and rhythm, S1, S2 normal   Abdomen:   Soft, non-tender. Bowel sounds normal. Non distended   Extremities: Right lower with dressing, redness has improved, left lower ext with prosthetic    Skin: Skin color, texture, turgor normal. No rashes or lesions.    Neurologic: Normal strength, no focal deficit appreciated         Data Review:       Labs:     Recent Labs   Lab 07/10/24  1954 07/11/24  0319 07/15/24  0655 07/16/24  0546 07/17/24  0505   RBC 5.61   < > 5.36 5.13 5.06   HGB 16.3   < > 15.3 14.9 14.6   HCT 48.1   < > 48.0 45.3 45.5   MCV 85.7   < > 89.6 88.3 89.9   MCH 29.1   < > 28.5 29.0 28.9   MCHC 33.9   < > 31.9 32.9 32.1   RDW 14.6   < > 14.6 14.5 14.6   NEPRELIM 9.25*  --   --   --   --    WBC 13.5*   < > 11.3* 12.6* 12.3*   .0*   < > 598.0* 522.0* 552.0*    < > = values in this  interval not displayed.         Recent Labs   Lab 07/15/24  0655 07/16/24  0546 07/16/24  0644 07/17/24  0505   * 252*  --  236*   BUN 29*  --  31* 28*   CREATSERUM 1.55* 1.65*  --  1.61*   EGFRCR 46* 43*  --  44*   CA 9.7 9.3  --  9.5    139  --  139   K 4.6  --  4.9 4.6    105  --  106   CO2 25.0 28.0  --  28.0       No results for input(s): \"ALT\", \"AST\", \"ALB\", \"AMYLASE\", \"LIPASE\", \"LDH\" in the last 168 hours.    Invalid input(s): \"ALPHOS\", \"TBIL\", \"DBIL\", \"TPROT\"      Imaging:  No results found.      Meds:      heparin  7,500 Units Subcutaneous Q8H JAYDEN    vancomycin  2,000 mg Intravenous Q24H    amLODIPine  5 mg Oral Daily    buPROPion ER  150 mg Oral Daily    gabapentin  100 mg Oral TID    pantoprazole  40 mg Oral QAM AC    insulin degludec  40 Units Subcutaneous Nightly    clopidogrel  75 mg Oral Daily    gemfibrozil  600 mg Oral Daily    rosuvastatin  20 mg Oral Nightly    insulin aspart  1-5 Units Subcutaneous TID CC    insulin aspart  20 Units Subcutaneous TID CC         acetaminophen    melatonin    polyethylene glycol (PEG 3350)    sennosides    bisacodyl    ondansetron    acetaminophen    glucose **OR** glucose **OR** glucose-vitamin C **OR** dextrose **OR** glucose **OR** glucose **OR** glucose-vitamin C

## 2024-07-17 NOTE — PROGRESS NOTES
[facility]  part of [facility] Infectious Disease  Progress Note    Connor Mcgarry Patient Status:  Inpatient    1950 MRN [id number]   Location [facility] 5SW/SE Attending Jonathan Amado MD   Hosp Day # 7 PCP Brittny Patino,      Subjective:  Patient seen/examined.  Discharge held yesterday due to insurance issues.  Patient with [employer] needs to go to [facility] infusion centers.  Discharge arrangements in progress this a.m.    Objective:  Blood pressure 154/75, pulse 86, temperature 98 °F (36.7 °C), temperature source Oral, resp. rate 20, height 5' 6\" (1.676 m), weight 287 lb 8 oz (130.4 kg), SpO2 92%.    Intake/Output:    Intake/Output Summary (Last 24 hours) at 2024 0916  Last data filed at 2024 1700  Gross per 24 hour   Intake 240 ml   Output --   Net 240 ml       Physical Exam:  General: Awake, alert, non-tox, NAD.  HEENT:  Oropharynx clear, trachea ML.  Heart: RRR S1S2 no murmurs.  Lungs: Essentially CTA b/l, no rhonchi, rales, wheezes.  Abdomen: Soft, NT/ND.  BS present.  No organomegaly.  Extremity: L BKA.  RLE erythema slowly fading.  Neurological: No focal deficits.  Derm:  Warm, dry, free from rashes.    Lab Data Review:  Lab Results   Component Value Date    WBC 12.3 2024    HGB 14.6 2024    HCT 45.5 2024    .0 2024    CREATSERUM 1.61 2024    BUN 28 2024     2024    K 4.6 2024     2024    CO2 28.0 2024     2024    CA 9.5 2024        Cultures:  7/10/24 blood cultures negative  7/10/24 wound cultures MRSA     Antibiotics Reviewed:  Vancomycin     Assessment and Plan:     Complicated RLE cellulitis with desquamation, blisters, and worsening drainage PTA  - Blood cultures negative  - Wound cultures with MRSA  - Local wound care ongoing  - IV vancomycin ongoing     2.  Multifactorial leukocytosis due to the above  - At 12.3K today and ~stable      3.  DM II  - Patient needs continued optimal glycemic control     4.  Disposition - stable form ID for discharge today as planned.  Continue local wound care as Rx.  Continue IV vancomycin as Rx.  PICC in place and planning for 2 more weeks of IV vancomycin pending clinical course.  F/u with ID at [facility] for PICC removal.  Patient agrees to this plan.    Jessika Farris DO, [facility] Infectious Disease  [phone]    7/17/2024  9:16 AM

## 2024-07-17 NOTE — PROGRESS NOTES
[facility] Hospitalist Progress Note     CC: Hospital Follow up    PCP: Brittny Patino DO       Assessment/Plan:     Principal Problem:    Cellulitis of right lower extremity    Mr. Mcgarry is a 74 year old male with PMH sig for type 2 DM, hs of left BKA, carotid artery stenosis, MDD, mobid obesity, CKD stage 3, who presents with recurrent right lower ext cellulitis with MRSA. PICC line with plans for 2 weeks iv vanco.      Right lower extremity cellulitis  Leucocytosis   MRSA  - erythema noted, not improving with doxy, WBC 13 on admit  - IV Vancomycin and zosyn started  - will continue with Vanco   - BC pending and wound cultures MRSA   - ID and wound care consulted  - elevated as tolerated   - PICC line with plans for 2 weeks iv vanco     Type 2 DM  - A1c 8.3  - on novalog 20 TID, and treciba 40 units  - SSI and accuchecks     Mild KARINA on CKD stage 3  - cre 1.5 on admit, at baseline range   - oral hydration   - monitor      HTN  - amlodipine 5 mg     Carotid artery disease  PAD  - see vascular Dr. Zepeda  - continue plavix     Hs of left BKA  - prosthetic in place     Depression   - continue home meds     Morbid obesity  - weight loss endevours as OP     FN:  - IVF: none  - Diet: diabetic diet     DVT Prophy: scd, heparin  Lines: PIV     Dispo: pending clinical course  Lives in independent living   Will need HH for wound care and iv abx   Still not able to obtain referral for outpatient infusion center?      Outpatient records or previous hospital records reviewed.      Further recommendations pending patient's clinical course.  [facility] hospitalist to continue to follow patient while in house     Patient and/or patient's family given opportunity to ask questions and note understanding and agreeing with therapeutic plan as outlined     Thank You,  Jonathan Amado MD     [facility]  Answering Service number: [phone]     Subjective:     No CP, SOB, or N/V.  Not much pain in the left leg.   Redness  has improved.     OBJECTIVE:    Blood pressure 139/59, pulse 86, temperature 98.3 °F (36.8 °C), temperature source Oral, resp. rate 18, height 5' 6\" (1.676 m), weight 287 lb 8 oz (130.4 kg), SpO2 94%.    Temp:  [98.2 °F (36.8 °C)-98.3 °F (36.8 °C)] 98.3 °F (36.8 °C)  Pulse:  [83-86] 86  Resp:  [18] 18  BP: (138-140)/(59-70) 139/59  SpO2:  [93 %-94 %] 94 %      Intake/Output:    Intake/Output Summary (Last 24 hours) at 7/16/2024 1925  Last data filed at 7/16/2024 1700  Gross per 24 hour   Intake 990 ml   Output --   Net 990 ml       Last 3 Weights   07/10/24 2121 287 lb 8 oz (130.4 kg)   07/10/24 1954 293 lb 3.4 oz (133 kg)   01/14/22 1054 277 lb 11.2 oz (126 kg)   10/12/21 0856 300 lb (136.1 kg)       Exam   General:  Alert, no distress   Head:  NC/AT   Eyes:  Sclera anicteric, No conjunctival pallor,     Nose: Nares normal. Mucosa normal.    Throat: Lips normal. Teeth and gums normal.   Neck: Supple   Lungs:   Clear to auscultation bilaterally. Normal effort   Chest wall:  No tenderness or deformity.   Heart:  Regular rate and rhythm, S1, S2 normal   Abdomen:   Soft, non-tender. Bowel sounds normal. Non distended   Extremities: Right lower with dressing, redness has improved, left lower ext with prosthetic    Skin: Skin color, texture, turgor normal. No rashes or lesions.    Neurologic: Normal strength, no focal deficit appreciated         Data Review:       Labs:     Recent Labs   Lab 07/10/24  1954 07/11/24  0319 07/14/24  0556 07/15/24  0655 07/16/24  0546   RBC 5.61   < > 5.15 5.36 5.13   HGB 16.3   < > 14.6 15.3 14.9   HCT 48.1   < > 47.4 48.0 45.3   MCV 85.7   < > 92.0 89.6 88.3   MCH 29.1   < > 28.3 28.5 29.0   MCHC 33.9   < > 30.8* 31.9 32.9   RDW 14.6   < > 14.7 14.6 14.5   NEPRELIM 9.25*  --   --   --   --    WBC 13.5*   < > 11.6* 11.3* 12.6*   .0*   < > 594.0* 598.0* 522.0*    < > = values in this interval not displayed.         Recent Labs   Lab 07/14/24  0556 07/15/24  0655 07/16/24  0546  07/16/24  0644   * 217* 252*  --    BUN 27* 29*  --  31*   CREATSERUM 1.54* 1.55* 1.65*  --    EGFRCR 47* 46* 43*  --    CA 9.2 9.7 9.3  --     138 139  --    K 4.5 4.6  --  4.9    105 105  --    CO2 26.0 25.0 28.0  --        No results for input(s): \"ALT\", \"AST\", \"ALB\", \"AMYLASE\", \"LIPASE\", \"LDH\" in the last 168 hours.    Invalid input(s): \"ALPHOS\", \"TBIL\", \"DBIL\", \"TPROT\"      Imaging:  No results found.      Meds:      heparin  7,500 Units Subcutaneous Q8H JAYDEN    vancomycin  2,000 mg Intravenous Q24H    amLODIPine  5 mg Oral Daily    buPROPion ER  150 mg Oral Daily    gabapentin  100 mg Oral TID    pantoprazole  40 mg Oral QAM AC    insulin degludec  40 Units Subcutaneous Nightly    clopidogrel  75 mg Oral Daily    gemfibrozil  600 mg Oral Daily    rosuvastatin  20 mg Oral Nightly    insulin aspart  1-5 Units Subcutaneous TID CC    insulin aspart  20 Units Subcutaneous TID CC         acetaminophen    melatonin    polyethylene glycol (PEG 3350)    sennosides    bisacodyl    ondansetron    acetaminophen    glucose **OR** glucose **OR** glucose-vitamin C **OR** dextrose **OR** glucose **OR** glucose **OR** glucose-vitamin C

## 2024-07-18 NOTE — PLAN OF CARE
Pt dc home with  education provided, dc paperwork provided, Pt transported via WC downstairs, picked dup by spouse.   Problem: PAIN - ADULT  Goal: Verbalizes/displays adequate comfort level or patient's stated pain goal  Description: INTERVENTIONS:  - Encourage pt to monitor pain and request assistance  - Assess pain using appropriate pain scale  - Administer analgesics based on type and severity of pain and evaluate response  - Implement non-pharmacological measures as appropriate and evaluate response  - Consider cultural and social influences on pain and pain management  - Manage/alleviate anxiety  - Utilize distraction and/or relaxation techniques  - Monitor for opioid side effects  - Notify MD/LIP if interventions unsuccessful or patient reports new pain  - Anticipate increased pain with activity and pre-medicate as appropriate  Outcome: Adequate for Discharge     Problem: SAFETY ADULT - FALL  Goal: Free from fall injury  Description: INTERVENTIONS:  - Assess pt frequently for physical needs  - Identify cognitive and physical deficits and behaviors that affect risk of falls.  - [place] fall precautions as indicated by assessment.  - Educate pt/family on patient safety including physical limitations  - Instruct pt to call for assistance with activity based on assessment  - Modify environment to reduce risk of injury  - Provide assistive devices as appropriate  - Consider OT/PT consult to assist with strengthening/mobility  - Encourage toileting schedule  Outcome: Adequate for Discharge     Problem: RISK FOR INFECTION - ADULT  Goal: Absence of fever/infection during anticipated neutropenic period  Description: INTERVENTIONS  - Monitor WBC  - Administer growth factors as ordered  - Implement neutropenic guidelines  Outcome: Adequate for Discharge     Problem: DISCHARGE PLANNING  Goal: Discharge to home or other facility with appropriate resources  Description: INTERVENTIONS:  - Identify barriers to discharge  w/pt and caregiver  - Include patient/family/discharge partner in discharge planning  - Arrange for needed discharge resources and transportation as appropriate  - Identify discharge learning needs (meds, wound care, etc)  - Arrange for interpreters to assist at discharge as needed  - Consider post-discharge preferences of patient/family/discharge partner  - Complete POLST form as appropriate  - Assess patient's ability to be responsible for managing their own health  - Refer to Case Management Department for coordinating discharge planning if the patient needs post-hospital services based on physician/LIP order or complex needs related to functional status, cognitive ability or social support system  Outcome: Adequate for Discharge

## 2024-07-19 NOTE — PAYOR COMM NOTE
--------------  DISCHARGE REVIEW    Payor: [employer] [facility]  Subscriber #:  [id number]  Authorization Number: [id number]    Admit date: 7/10/24  Admit time:   9:18 PM  Discharge Date: 7/17/2024  7:58 PM     Admitting Physician: Janna Horne MD  Attending Physician:  Sujatha att. providers found  Primary Care Physician: Brittny Patino DO          Discharge Summary Notes        Discharge Summary signed by Jonathan Amado MD at 7/17/2024  8:17 AM       Author: Jonathan Amado MD Specialty: HOSPITALIST Author Type: Physician    Filed: 7/17/2024  8:17 AM Date of Service: 7/17/2024  8:10 AM Status: Addendum    : Jonathan Amado MD (Physician)    Related Notes: Original Note by Jonathan Amado MD (Physician) filed at 7/16/2024 11:34 AM           General Medicine Discharge Summary     Patient ID:  Connor Mcgarry  74 year old  6/24/1950    Admit date: 7/10/2024    Discharge date and time: 7/17/24    Attending Physician: Jonathan Amado MD     Consults: IP CONSULT TO PHARMACY  IP CONSULT TO INFECTIOUS DISEASE  IP CONSULT TO PHARMACY  CONSULT TO WOUND OSTOMY  IP CONSULT TO VASCULAR ACCESS TEAM  IP CONSULT TO SOCIAL WORK  IP CONSULT TO SOCIAL WORK    Primary Care Physician: Brittny Patino DO     Reason for admission: Right leg cellulitis     Risk For Readmission: low    Discharge Diagnoses: Cellulitis of right lower extremity [L03.115]  See Additional Discharge Diagnoses in Hospital Course    Discharged Condition: stable    Follow-up with labs/images appointments: PCP, ID    Risk patient: Coordinator contacted for pcp apt     Exam  Gen: No acute distress  Pulm: Lungs clear, normal respiratory effort  CV: Heart with regular rate and rhythm  Abd: Abdomen soft,   Ext: Right lower with dressing, redness has improved, left lower ext with prosthetic     HPI: per chart  Mr. Mcgarry is a 74 year old male with PMH sig for type 2 DM, hs of left BKA, carotid artery stenosis, MDD, mobid obesity, CKD stage 3, who presents with recurrent  right lower ext cellulitis.  Patient has had a history of cellulitis of his right lower extremity, recently treated with antibiotics with resolution, but had recurrence of symptoms redness and pain about 1 week ago, started on doxycycline, with minimal improvement, significant drainage surrounding the wound, therefore was recommended to come to the ER.  He denies any fever or chills, no chest pain or shortness of breath, no headaches or vision changes, no lightheadedness or dizziness, no other complaints at this time.     Hospital Course:   Mr. Mcgarry is a 74 year old male with PMH sig for type 2 DM, hs of left BKA, carotid artery stenosis, MDD, mobid obesity, CKD stage 3, who presents with recurrent right lower ext cellulitis with MRSA. Seen by ID, PICC line with plans for 2 weeks [facility]anco [facility].      Right lower extremity cellulitis  Leucocytosis   MRSA  - erythema noted, not improving with doxy, WBC 13 on admit  - IV Vancomycin and zosyn started  - will continue with Vanco   - BC no growth and wound cultures MRSA   - ID and wound care consulted  - elevated as tolerated   - PICC line with plans for 2 weeks iv vanco     Type 2 DM  - A1c 8.3  - on novalog 20 TID, and treciba 40 units  - SSI and accuchecks     Mild KARINA on CKD stage 3  - cre 1.5 on admit, at baseline range   - oral hydration   - monitor      HTN  - amlodipine 5 mg     Carotid artery disease  PAD  - see vascular Dr. Zepeda  - continue plavix     Hs of left BKA  - prosthetic in place     Depression   - continue home meds     Morbid obesity  - weight loss endevours as OP    Operative Procedures:      Imaging: No results found.    Disposition: home    Activity: as tolerated   Diet: general   Wound Care: no needs  Code Status: Full Code  O2: no needs    Home Medication Changes: as below   All discharge medications have been reconciled with current medication list.     Med list     Medication List        START taking these medications       Vancomycin HCl in NaCl 1-0.9 GM/250ML-% Soln  Inject 500 mL (2 g total) into the vein daily for 14 days.            CHANGE how you take these medications      NovoLOG FlexPen 100 UNIT/ML Sopn  Generic drug: insulin aspart  INJECT 10 UNITS BEFORE EACH MEAL THREE TIMES DAILY PLUS CORRECTION AS DIRECTED. MAX DAILY DOSE IS 75 UNITS  What changed:   how much to take  how to take this  when to take this  additional instructions            CONTINUE taking these medications      amLODIPine 5 MG Tabs  Commonly known as: Norvasc  Take 1 tablet (5 mg total) by mouth daily.     BD Swab Single Use Regular Pads  APPLY TOPICALLY FOUR TIMES DAILY.     buPROPion  MG Tb24  Commonly known as: Wellbutrin XL  Take 1 tablet (150 mg total) by mouth daily.     clopidogrel 75 MG Tabs  Commonly known as: Plavix     gabapentin 100 MG Caps  Commonly known as: Neurontin  Take 1 capsule (100 mg total) by mouth 3 (three) times daily.     gemfibrozil 600 MG Tabs  Commonly known as: Lopid     Omeprazole 40 MG Cpdr  Take 1 capsule (40 mg total) by mouth daily.     Pen Needles 5/16\" 31G X 8 MM Misc  Inject 1 Act into the skin in the morning, at noon, and at bedtime.     rosuvastatin 20 MG Tabs  Commonly known as: Crestor     Tresiba FlexTouch 100 UNIT/ML Sopn  Generic drug: insulin degludec  Inject 66 Units/day into the skin daily.     True Metrix Blood Glucose Test Strp  1 strip by Finger stick route in the morning, at noon, and at bedtime.     True Metrix Meter w/Device Kit  USE AS DIRECTED     TRUEplus Lancets 33G Misc  1 lancet by Finger stick route in the morning, at noon, and at bedtime. Dx E11.9            STOP taking these medications      doxycycline 100 MG Caps  Commonly known as: Vibramycin               Where to Get Your Medications        You can get these medications from any pharmacy    Bring a paper prescription for each of these medications  Vancomycin HCl in NaCl 1-0.9 GM/250ML-% Soln         FU   Follow-up Information        Brandin Vences APRN Follow up on 7/30/2024.    Specialty: Nurse Practitioner  Why: 7/30 at 12 pm via telemedicine  Contact information:  [address]  [address]  [place] [id number] [phone]               Brittny Patino,  Follow up on 7/18/2024.    Specialty: Family Medicine  Why: 7/18 at 6:30  Contact information:  [address]  [address]  [place] [address] [phone]                             DC instructions:      Other Discharge Instructions:         Keep all follow up appointments and continue current medications.         Patient had opportunity to ask questions and state understand and agree with therapeutic plan as outlined    Thank You,    Jonathan Amado M.D.  [facility]      Electronically signed by Jonathan Amado MD on 7/17/2024  8:17 AM

## 2024-08-01 ENCOUNTER — HOSPITAL ENCOUNTER (EMERGENCY)
Facility: HOSPITAL | Age: 74
Discharge: HOME | End: 2024-08-01
Attending: EMERGENCY MEDICINE
Payer: MEDICARE

## 2024-08-01 VITALS
TEMPERATURE: 98 F | SYSTOLIC BLOOD PRESSURE: 148 MMHG | WEIGHT: 287.5 LBS | DIASTOLIC BLOOD PRESSURE: 63 MMHG | BODY MASS INDEX: 46 KG/M2 | OXYGEN SATURATION: 91 % | RESPIRATION RATE: 20 BRPM | HEART RATE: 78 BPM

## 2024-08-01 DIAGNOSIS — H10.31 ACUTE CONJUNCTIVITIS OF RIGHT EYE, UNSPECIFIED ACUTE CONJUNCTIVITIS TYPE: ICD-10-CM

## 2024-08-01 DIAGNOSIS — B37.2 CANDIDAL DERMATITIS: Primary | ICD-10-CM

## 2024-08-01 LAB — GLUCOSE BLDC GLUCOMTR-MCNC: 166 MG/DL (ref 70–99)

## 2024-08-01 PROCEDURE — 99284 EMERGENCY DEPT VISIT MOD MDM: CPT

## 2024-08-01 PROCEDURE — 99283 EMERGENCY DEPT VISIT LOW MDM: CPT

## 2024-08-01 PROCEDURE — 82962 GLUCOSE BLOOD TEST: CPT

## 2024-08-01 RX ORDER — NYSTATIN 100000 U/G
1 CREAM TOPICAL 3 TIMES DAILY
Qty: 30 G | Refills: 0 | Status: SHIPPED | OUTPATIENT
Start: 2024-08-01 | End: 2024-08-15

## 2024-08-01 NOTE — ED INITIAL ASSESSMENT (HPI)
Pt to ED with multiple complaints.   Pt c/o atraumatic left eye sclera redness for a few days. Pt states he was started on antibiotic drops on Tuesday for eye. Denies visual changes. +eye discharge noted. Denies fever.   Pt c/o \"jock itch\" to bilateral scrotum and groin.   Pt states \"erratic\" blood sugars at home.  Pt is alert and oriented x4. No respiratory distress noted.

## 2024-08-01 NOTE — ED PROVIDER NOTES
Patient Seen in: [facility] Emergency Department      History     Chief Complaint   Patient presents with    Eye Pain    Eval-G     Stated Complaint: EVAL-G, pink eye, difficulty controlling BS    Subjective:   HPI        Objective:   Past Medical History:    Anemia    BPH (benign prostatic hyperplasia)    Calculus of kidney    Cataract    with lens implants in both eyes    Hearing impairment    Has hearing aid in right ear.    Hiatal hernia    High blood pressure    High cholesterol    HTN (hypertension)    Hypertrophy of prostate without urinary obstruction and other lower urinary tract symptoms (LUTS)    Neuropathy    bill feet    Other and unspecified hyperlipidemia    Peripheral vascular disease (HCC)    Type II or unspecified type diabetes mellitus without mention of complication, not stated as uncontrolled    Unspecified essential hypertension              Past Surgical History:   Procedure Laterality Date    Angiogram      Angioplasty (coronary)      Cataract      Other      heplock ? for IV antibiotic    Other surgical history  2012    Cataract surgery Left eye    Other surgical history  7/2/14    Cysto Stent Removal-Dr Claros    Other surgical history  5/2014    Left great toe amputation    Other surgical history  1966    spleenectomy     Other surgical history  8/22/14    Left knee reamputation BKA    Remv cataract extracap,insert lens  11/29/2012    Procedure: RIGHT PHACOEMULSIFICATION OF CATARACT WITH INTRAOCULAR LENS IMPLANT 10250;  Surgeon: Delia Rizvi MD;  Location: [facility]    Subconjunctival injectn  12/11/2012    Procedure: PARS PLANA VITRECTOMY 25 GAUGE;  Surgeon: Geovany Odom MD;  Location: [facility]    Vitrectomy,panretinal laser rx  12/11/2012    Procedure: PARS PLANA VITRECTOMY 25 GAUGE;  Surgeon: Geovany Odom MD;  Location: [facility]                Social History     Socioeconomic History    Marital status:    Tobacco Use     Smoking status: Never    Smokeless tobacco: Never   Substance and Sexual Activity    Alcohol use: No     Alcohol/week: 0.0 standard drinks of alcohol    Drug use: No     Social Determinants of Health     Food Insecurity: No Food Insecurity (7/11/2024)    Food Insecurity     Food Insecurity: Never true   Transportation Needs: No Transportation Needs (7/11/2024)    Transportation Needs     Lack of Transportation: No   Housing Stability: Low Risk  (7/11/2024)    Housing Stability     Housing Instability: No              Review of Systems    Positive for stated Chief Complaint: Eye Pain and Eval-G    Other systems are as noted in HPI.  Constitutional and vital signs reviewed.      All other systems reviewed and negative except as noted above.    Physical Exam     ED Triage Vitals [08/01/24 1739]   BP (!) 163/83   Pulse 83   Resp 19   Temp 98 °F (36.7 °C)   Temp src Temporal   SpO2 94 %   O2 Device None (Room air)       Current Vitals:   Vital Signs  BP: 148/63  Pulse: 78  Resp: 20  Temp: 98.3 °F (36.8 °C)  Temp src: Oral    Oxygen Therapy  SpO2: 91 %  O2 Device: None (Room air)            Physical Exam          ED Course     Labs Reviewed   POCT GLUCOSE - Abnormal; Notable for the following components:       Result Value    POC Glucose  166 (*)     All other components within normal limits                      MDM      74-year-old male with a history of diabetes, hypertension, hyperlipidemia, peripheral vascular disease, and who was recently admitted earlier this month for right lower extremity cellulitis and treated with vancomycin including a PICC line which was discontinued 2 days ago presents today for right eye redness and perineal discomfort.  Patient states that he started having some redness and itching of his right eye associated with some discharge a few days ago.  He was seen at the infectious disease clinic 2 days ago and prescribed ofloxacin drops for potential bacterial conjunctivitis.  He was just able  to fill the prescription today and took the first doses.  Denies any worsening of symptoms, fevers, blurry vision, double vision, or increasing pain.  At that visit, he also had reported some worsening redness and discomfort in his groin and scrotum.  He was diagnosed with tinea cruris and started on a course of fluconazole.  The symptoms have also continued without significant worsening.  Lastly, he reports some lability in his blood sugars ranging from the 50s to 300s.  He continues to take his medications as prescribed.    On exam, vitals reassuring, well-appearing, normal mental status.  Right eye with conjunctival and scleral injection that spares the limbus.  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  No proptosis.  No active drainage.  Left eye normal.  Genital exam with beefy red induration throughout the scrotum as well as the proximal thighs and beneath his pannus.  Satellite lesions noted.    Differential: Right eye conjunctivitis, concern for bacterial infection but currently on antibiotic drops.  Candida dermatitis of the perineum, scrotum, and beneath the pannus.    Will advise the patient to continue using his antibiotic ophthalmic drops and follow-up with ophthalmology  Will prescribe nystatin cream in addition to his fluconazole for management of the Candida dermatitis with PMD follow-up as needed.    Patient also advised to monitor blood sugars closely and return to the ER if he is having difficulty controlling them.                                   MDM    Disposition and Plan     Clinical Impression:  1. Candidal dermatitis    2. Acute conjunctivitis of right eye, unspecified acute conjunctivitis type         Disposition:  Discharge  8/1/2024  6:09 pm    Follow-up:  Brittny Patino DO  [address]   [address]  [facility] [id number] [phone]    Follow up in 1 week(s)  As needed    We recommend that you schedule follow up care with a primary care  provider within the next three months to obtain basic health screening including reassessment of your blood pressure.      Medications Prescribed:  Discharge Medication List as of 8/1/2024  6:16 PM        START taking these medications    Details   nystatin 100,000 Units/g External Cream Apply 1 Application topically in the morning, at noon, and at bedtime for 14 days., Normal, Disp-30 g, R-0

## (undated) NOTE — LETTER
[facility]  part of [facility]     PICC INSERTION CONSENT     I agree to have a Peripherally Inserted Central Catheter (PICC) placed in my arm.   1. The PICC insertion procedure, care, maintenance, risks, benefits, and complications have been explained to me by my physician, ________________________, and I understand them.   2. I understand that this may not be the only way I can receive my medication. I understand that my physician has determined that the PICC would be the safest and most effective means of administering my medication at this time. If there are other options of giving medication into my veins those options have been explained to me by my physician and I have chosen this one.   3. I realize a nurse who has been specially trained and certified by the hospital and ’s representative to insert a PICC will perform this procedure. My catheter will be inserted by _____________________________.   4. I have been informed by my doctor of the nature and purpose of this procedure and the risks involved and the possibility of complications. I realize that this is an invasive procedure and has certain risks such as air embolism (air entering the catheter or my vein), arterial puncture (a tearing of one of my arteries), infection, irregular heartbeat and venous thrombosis (a blood clot in a vein) nerve injury and fracture of the catheter with or without migration.   5. In order to numb the area where the line will be placed, a small amount of anesthetic medication will be injected as ordered by my physician.   6. I understand that while the catheter will be placed in my upper arm the end of the catheter will come to rest in an area near my heart.     7. The person performing this procedure has discussed the potential benefits, risks, and side effects of the PICC; the likelihood of achieving goals; and potential problems that might occur during recuperation. They also discussed  reasonable alternatives to the PICC, including risks, benefits, and side effects related to the alternatives and risks related to not receiving this procedure.    8.  I have expressed any questions about this procedure to my physician or the PICC Proceduralist and he/she has answered them.  I certify that I have read and understand this consent to the insertion of a PICC.      _________________________________________________________   Date     Time     Patient/Guardian Signature       ____________________________________   Printed name of Patient/Guardian          ________________________________________________________________    Date        Time                   Witnessing RN Signature      Patient Name: Connor Mcgarry     : 1950                 Printed: 2024     Medical Record #: [id number]

## (undated) NOTE — LETTER
CONTINUOUS GLUCOSE MONITOR AND INSULIN PUMP AGREEMENT     CONTINUOUS GLUCOSE MONITOR (CGM) (If not signed, not applicable)     CGMs are not currently approved by the FDA for use in the hospital. If you choose to continue to wear your CGM in the hospital, we ask that you agree to the following:     ?   Allow finger stick blood glucose tests to be performed using hospital glucose monitor.   ?   Insulin dosing and hypoglycemia (low blood sugar) treatment will be provided based on hospital glucose monitor        results.   ?   Remove your CGM on or before the date it is due to be removed or as ordered by physician.   ?   Remove your CGM prior to any scheduled surgery and as instructed for procedures.   ?   Remove your CGM prior to Magnetic Resonance Imaging (MRI), Computerized Tomography (CT) or x-ray as it can      damage your CGM.   ?   Inform staff of any skin irritation, redness, or other problems with your skin or CGM.     __________________________________________________________ ________________________________   Patient/Guardian Signature                                                                                  Date/Time     __________________________________________________________   Print Guardian Name     __________________________________________________________ ________________________________   Staff/RN Signature                                                                                                 Date/Time      INSULIN PUMP (If not signed, not applicable)     For your safety and best possible care, we ask that you agree to the following. If you cannot agree to the following requirements of this agreement, or, if it is not possible for you to meet these requirements, we will provide and administer insulin based on your provider’s orders.     ?   Communicate your site changes, carbohydrate intake, and all boluses to your nurse.   ?   Provide your own insulin and pump supplies.   ?   Change  your infusion site at least every 3 days, and as needed for skin irritation or two consecutive glucose readings       greater than 240 mg/dL.   ?   Make no changes to your basal rates, carb ratios, or correction factor unless directed to do so by the physician        managing your insulin pump.   ?   Notify your nurse before you eat so that your blood glucose level can be obtained with hospital glucose monitor.   ?   Report symptoms of low blood sugar to your nurse immediately.   ?   Notify your nurse of any problems with your pump that you cannot correct.     I understand that my insulin pump may be discontinued and a different method of insulin delivery will be provided for any of the following reasons:     ?   Inability to safely perform diabetes self-management activities because of the condition for which I was hospitalized,        or side effects of my treatment.   ? Inability or inconsistency in performing the diabetes self-management activities described above.     __________________________________________________________ ________________________________   Patient/Guardian Signature                                                                                 Date/Time     __________________________________________________________   Print Guardian Name     __________________________________________________________ ________________________________   Staff/RN Signature                                                                                                 Date/Time       Patient Name: Connor Mcgarry     : 1950                 Printed: 2024     Medical Record #: [id number]                                            Page         INSULIN PUMP NURSING CHECKLIST   ON ADMISSION     ? Document insulin pump in Medical Devices/Implants section of Epic (even if patient not wearing pump in hospital).   ? Use link in Insulin Pump BPA to print the CGM/Insulin Pump Agreement and Checklists.     ? Apply patient label to Agreement and have patient read and sign it. Place on chart.   ? Add Insulin Pump LDA to the IV Assessment flowsheet.   ? Endocrinology to be consulted (except at [facility]):    ? Roland: Attending to initiate consult.    ?  Jeffry: Open endocrinologist order through BPA (no cosign required). Page on-call endocrinologist          through [employer].    ?  Humberto Das: Notify attending of insulin pump.     EVERY SHIFT     ? Document pump site assessment and any site changes. ADMISSION   ? Chart ALL insulin bolus doses in MAR as “Self-administered via pump”.     PROTOCOL REMINDERS     ? Insulin Pump Focused order set is to be used for all patients using an insulin pump.   ? Patient to provide own pump supplies and insulin.   ? Point of care glucose to be performed using hospital glucometer, even if wearing a continuous glucose           monitor.     NOTIFY ENDOCRINOLOGY OR MFM  FOR: (at [facility], notify attending)     Temporary discontinuation of pump infusion for more than one hour   Surgery   Change in patient condition, mental status, or compliance that prohibits ability to self-manage the insulin pump   Patient reports insulin pump not operating properly   Patient does not have appropriate insulin or supplies for insulin pump   Risk for suicide   Blood glucose outside ranges indicated in insulin pump orders       DO NOT REMOVE INSULIN PUMP WITHOUT ORDER FROM ENDOCRINOLOGIST/MGM -  or attending at [facility]     NOT PART OF PERMANENT CHART     Patient Name: Connor Mcgarry     : 1950                 Printed: 2024     Medical Record #: [id number]                                            Page  FOR: (at [facility],     CONTINUOUS GLUCOSE MONITOR (CGM) NURSING CHECKLIST     A Continuous Glucose Monitor (CGM), also referred to as a “sensor”, is a small device that takes frequent blood glucose (BG) readings, approximately every 5 minutes.   BG is measured in interstitial fluid by a  tiny electrode under the skin.   The CGM can be worn up to 10-14 days, depending on the model.   Often used along with an insulin pump, but may also be a stand-alone device.      ON ADMISSION     ?   Document CGM in Medical Devices/Implants in Admission Navigator  ?   When BPA fires, print the GM/Insulin Pump Agreement, place patient label, have patient read and sign and place on        chart.   ?   Add CGM LDA to the IV Assessment flowsheet   ?   Inform patient that HOSPITAL GLUCOSE MONITOR will be used for BG testing   ?   CGM not approved by FDA for inpatient use    ?   Frequent quality tests are performed on hospital glucose monitor                ?   Results of hospital glucose monitor cross over into electronic medical record   ?   Some medications interfere with CGM models including acetaminophen, aspirin, and Vitamin C    IMPORTANT INFORMATION     ? Point of care glucose to be performed using HOSPITAL glucose monitor.   ? Do not treat with insulin or treat hypoglycemia based on CGM values.   ? Patient to remove CGM before MRI, CT, x-ray, or any procedure that uses radiation such as ERCP,         fluoroscopic exam, IV Pyelogram, mammogram, cardiac cath, etc.   ? Exposure to above imaging may damage the CGM causing inaccurate BG readings, or prevent the         device from alarming.   ? Patient to remove CGM prior to surgery as it is often unknown if x-rays or other imaging will be used.   ? If CGM is removed or falls off, give to patient or family, or store according to hospital policy. Not all parts         are disposable and replacement can be very expensive.   ? Every shift, document CGM site assessment   ? Chart removal of CGM     NOTIFY ATTENDING/ENDOCRINOLOGIST IF:     ? Patient refuses to allow finger stick tests with hospital glucose monitor.   ? Any problems or irritation at CGM site.      NOT PART OF PERMANENT CHART  Patient Name: Connor Mcgarry     : 1950                 Printed:   2024     Medical Record #: [id number]                                            Page 1/1 24/7